# Patient Record
Sex: FEMALE | Race: WHITE | Employment: FULL TIME | URBAN - METROPOLITAN AREA
[De-identification: names, ages, dates, MRNs, and addresses within clinical notes are randomized per-mention and may not be internally consistent; named-entity substitution may affect disease eponyms.]

---

## 2022-03-26 ENCOUNTER — APPOINTMENT (OUTPATIENT)
Dept: GENERAL RADIOLOGY | Age: 62
DRG: 063 | End: 2022-03-26
Attending: EMERGENCY MEDICINE
Payer: COMMERCIAL

## 2022-03-26 ENCOUNTER — APPOINTMENT (OUTPATIENT)
Dept: CT IMAGING | Age: 62
DRG: 063 | End: 2022-03-26
Attending: PHYSICIAN ASSISTANT
Payer: COMMERCIAL

## 2022-03-26 ENCOUNTER — HOSPITAL ENCOUNTER (INPATIENT)
Age: 62
LOS: 3 days | Discharge: HOME HEALTH | DRG: 063 | End: 2022-03-29
Attending: EMERGENCY MEDICINE | Admitting: FAMILY MEDICINE
Payer: COMMERCIAL

## 2022-03-26 DIAGNOSIS — I63.9 CEREBROVASCULAR ACCIDENT (CVA), UNSPECIFIED MECHANISM (HCC): Primary | ICD-10-CM

## 2022-03-26 PROBLEM — I10 HTN (HYPERTENSION): Status: ACTIVE | Noted: 2022-03-26

## 2022-03-26 PROBLEM — R73.03 PREDIABETES: Status: ACTIVE | Noted: 2022-03-26

## 2022-03-26 LAB
ALBUMIN SERPL-MCNC: 4 G/DL (ref 3.4–5)
ALBUMIN/GLOB SERPL: 1 {RATIO} (ref 0.8–1.7)
ALP SERPL-CCNC: 105 U/L (ref 45–117)
ALT SERPL-CCNC: 26 U/L (ref 13–56)
AMPHET UR QL SCN: NEGATIVE
ANION GAP SERPL CALC-SCNC: 7 MMOL/L (ref 3–18)
AST SERPL-CCNC: 21 U/L (ref 10–38)
BARBITURATES UR QL SCN: NEGATIVE
BASOPHILS # BLD: 0 K/UL (ref 0–0.1)
BASOPHILS NFR BLD: 1 % (ref 0–2)
BENZODIAZ UR QL: NEGATIVE
BILIRUB SERPL-MCNC: 0.4 MG/DL (ref 0.2–1)
BUN SERPL-MCNC: 18 MG/DL (ref 7–18)
BUN/CREAT SERPL: 15 (ref 12–20)
CALCIUM SERPL-MCNC: 9.5 MG/DL (ref 8.5–10.1)
CANNABINOIDS UR QL SCN: NEGATIVE
CHLORIDE SERPL-SCNC: 105 MMOL/L (ref 100–111)
CO2 SERPL-SCNC: 27 MMOL/L (ref 21–32)
COCAINE UR QL SCN: NEGATIVE
CREAT SERPL-MCNC: 1.19 MG/DL (ref 0.6–1.3)
DIFFERENTIAL METHOD BLD: ABNORMAL
EOSINOPHIL # BLD: 0.1 K/UL (ref 0–0.4)
EOSINOPHIL NFR BLD: 1 % (ref 0–5)
ERYTHROCYTE [DISTWIDTH] IN BLOOD BY AUTOMATED COUNT: 14.2 % (ref 11.6–14.5)
GLOBULIN SER CALC-MCNC: 4.2 G/DL (ref 2–4)
GLUCOSE BLD STRIP.AUTO-MCNC: 117 MG/DL (ref 70–110)
GLUCOSE SERPL-MCNC: 132 MG/DL (ref 74–99)
HCT VFR BLD AUTO: 43.6 % (ref 35–45)
HDSCOM,HDSCOM: NORMAL
HGB BLD-MCNC: 13.4 G/DL (ref 12–16)
IMM GRANULOCYTES # BLD AUTO: 0 K/UL (ref 0–0.04)
IMM GRANULOCYTES NFR BLD AUTO: 0 % (ref 0–0.5)
LYMPHOCYTES # BLD: 3.3 K/UL (ref 0.9–3.6)
LYMPHOCYTES NFR BLD: 41 % (ref 21–52)
MCH RBC QN AUTO: 25 PG (ref 24–34)
MCHC RBC AUTO-ENTMCNC: 30.7 G/DL (ref 31–37)
MCV RBC AUTO: 81.2 FL (ref 78–100)
METHADONE UR QL: NEGATIVE
MONOCYTES # BLD: 0.5 K/UL (ref 0.05–1.2)
MONOCYTES NFR BLD: 7 % (ref 3–10)
NEUTS SEG # BLD: 4.2 K/UL (ref 1.8–8)
NEUTS SEG NFR BLD: 51 % (ref 40–73)
NRBC # BLD: 0 K/UL (ref 0–0.01)
NRBC BLD-RTO: 0 PER 100 WBC
OPIATES UR QL: NEGATIVE
PCP UR QL: NEGATIVE
PLATELET # BLD AUTO: 288 K/UL (ref 135–420)
PMV BLD AUTO: 9.5 FL (ref 9.2–11.8)
POTASSIUM SERPL-SCNC: 3.9 MMOL/L (ref 3.5–5.5)
PROT SERPL-MCNC: 8.2 G/DL (ref 6.4–8.2)
RBC # BLD AUTO: 5.37 M/UL (ref 4.2–5.3)
SODIUM SERPL-SCNC: 139 MMOL/L (ref 136–145)
TROPONIN-HIGH SENSITIVITY: 10 NG/L (ref 0–54)
WBC # BLD AUTO: 8.2 K/UL (ref 4.6–13.2)

## 2022-03-26 PROCEDURE — 74011000250 HC RX REV CODE- 250: Performed by: FAMILY MEDICINE

## 2022-03-26 PROCEDURE — 82962 GLUCOSE BLOOD TEST: CPT

## 2022-03-26 PROCEDURE — 70496 CT ANGIOGRAPHY HEAD: CPT

## 2022-03-26 PROCEDURE — 80053 COMPREHEN METABOLIC PANEL: CPT

## 2022-03-26 PROCEDURE — 94762 N-INVAS EAR/PLS OXIMTRY CONT: CPT

## 2022-03-26 PROCEDURE — 93005 ELECTROCARDIOGRAM TRACING: CPT

## 2022-03-26 PROCEDURE — 74011000250 HC RX REV CODE- 250: Performed by: EMERGENCY MEDICINE

## 2022-03-26 PROCEDURE — 99285 EMERGENCY DEPT VISIT HI MDM: CPT

## 2022-03-26 PROCEDURE — 80307 DRUG TEST PRSMV CHEM ANLYZR: CPT

## 2022-03-26 PROCEDURE — 85025 COMPLETE CBC W/AUTO DIFF WBC: CPT

## 2022-03-26 PROCEDURE — 71045 X-RAY EXAM CHEST 1 VIEW: CPT

## 2022-03-26 PROCEDURE — 74011250636 HC RX REV CODE- 250/636: Performed by: EMERGENCY MEDICINE

## 2022-03-26 PROCEDURE — 3E03317 INTRODUCTION OF OTHER THROMBOLYTIC INTO PERIPHERAL VEIN, PERCUTANEOUS APPROACH: ICD-10-PCS | Performed by: FAMILY MEDICINE

## 2022-03-26 PROCEDURE — 70450 CT HEAD/BRAIN W/O DYE: CPT

## 2022-03-26 PROCEDURE — 74011000636 HC RX REV CODE- 636: Performed by: EMERGENCY MEDICINE

## 2022-03-26 PROCEDURE — 96376 TX/PRO/DX INJ SAME DRUG ADON: CPT

## 2022-03-26 PROCEDURE — 84484 ASSAY OF TROPONIN QUANT: CPT

## 2022-03-26 PROCEDURE — 96374 THER/PROPH/DIAG INJ IV PUSH: CPT

## 2022-03-26 PROCEDURE — 65610000006 HC RM INTENSIVE CARE

## 2022-03-26 PROCEDURE — 74011000258 HC RX REV CODE- 258: Performed by: EMERGENCY MEDICINE

## 2022-03-26 PROCEDURE — 96365 THER/PROPH/DIAG IV INF INIT: CPT

## 2022-03-26 PROCEDURE — 74011250637 HC RX REV CODE- 250/637: Performed by: EMERGENCY MEDICINE

## 2022-03-26 PROCEDURE — 74011250636 HC RX REV CODE- 250/636: Performed by: FAMILY MEDICINE

## 2022-03-26 RX ORDER — ATORVASTATIN CALCIUM 20 MG/1
80 TABLET, FILM COATED ORAL
Status: DISCONTINUED | OUTPATIENT
Start: 2022-03-26 | End: 2022-03-29 | Stop reason: HOSPADM

## 2022-03-26 RX ORDER — MAGNESIUM SULFATE 100 %
16 CRYSTALS MISCELLANEOUS AS NEEDED
Status: DISCONTINUED | OUTPATIENT
Start: 2022-03-26 | End: 2022-03-29 | Stop reason: HOSPADM

## 2022-03-26 RX ORDER — INSULIN LISPRO 100 [IU]/ML
INJECTION, SOLUTION INTRAVENOUS; SUBCUTANEOUS EVERY 6 HOURS
Status: DISCONTINUED | OUTPATIENT
Start: 2022-03-27 | End: 2022-03-27

## 2022-03-26 RX ORDER — DEXTROSE MONOHYDRATE 100 MG/ML
0-250 INJECTION, SOLUTION INTRAVENOUS AS NEEDED
Status: DISCONTINUED | OUTPATIENT
Start: 2022-03-26 | End: 2022-03-28 | Stop reason: SDUPTHER

## 2022-03-26 RX ORDER — SODIUM CHLORIDE, SODIUM LACTATE, POTASSIUM CHLORIDE, CALCIUM CHLORIDE 600; 310; 30; 20 MG/100ML; MG/100ML; MG/100ML; MG/100ML
75 INJECTION, SOLUTION INTRAVENOUS CONTINUOUS
Status: DISCONTINUED | OUTPATIENT
Start: 2022-03-27 | End: 2022-03-28

## 2022-03-26 RX ORDER — SODIUM CHLORIDE 0.9 % (FLUSH) 0.9 %
5-40 SYRINGE (ML) INJECTION EVERY 8 HOURS
Status: DISCONTINUED | OUTPATIENT
Start: 2022-03-26 | End: 2022-03-29 | Stop reason: HOSPADM

## 2022-03-26 RX ORDER — SODIUM CHLORIDE 0.9 % (FLUSH) 0.9 %
5-40 SYRINGE (ML) INJECTION AS NEEDED
Status: DISCONTINUED | OUTPATIENT
Start: 2022-03-26 | End: 2022-03-29 | Stop reason: SDUPTHER

## 2022-03-26 RX ORDER — DEXTROSE MONOHYDRATE 100 MG/ML
0-250 INJECTION, SOLUTION INTRAVENOUS AS NEEDED
Status: DISCONTINUED | OUTPATIENT
Start: 2022-03-26 | End: 2022-03-29 | Stop reason: HOSPADM

## 2022-03-26 RX ORDER — ONDANSETRON 2 MG/ML
4 INJECTION INTRAMUSCULAR; INTRAVENOUS
Status: DISCONTINUED | OUTPATIENT
Start: 2022-03-26 | End: 2022-03-29 | Stop reason: HOSPADM

## 2022-03-26 RX ORDER — SODIUM CHLORIDE 0.9 % (FLUSH) 0.9 %
5-40 SYRINGE (ML) INJECTION EVERY 8 HOURS
Status: DISCONTINUED | OUTPATIENT
Start: 2022-03-26 | End: 2022-03-29 | Stop reason: SDUPTHER

## 2022-03-26 RX ORDER — POLYETHYLENE GLYCOL 3350 17 G/17G
17 POWDER, FOR SOLUTION ORAL DAILY PRN
Status: DISCONTINUED | OUTPATIENT
Start: 2022-03-26 | End: 2022-03-29 | Stop reason: HOSPADM

## 2022-03-26 RX ORDER — LABETALOL HCL 20 MG/4 ML
10 SYRINGE (ML) INTRAVENOUS ONCE
Status: COMPLETED | OUTPATIENT
Start: 2022-03-26 | End: 2022-03-26

## 2022-03-26 RX ORDER — ACETAMINOPHEN 325 MG/1
650 TABLET ORAL
Status: DISCONTINUED | OUTPATIENT
Start: 2022-03-26 | End: 2022-03-29 | Stop reason: HOSPADM

## 2022-03-26 RX ORDER — ACETAMINOPHEN 650 MG/1
650 SUPPOSITORY RECTAL
Status: DISCONTINUED | OUTPATIENT
Start: 2022-03-26 | End: 2022-03-29 | Stop reason: HOSPADM

## 2022-03-26 RX ORDER — ATORVASTATIN CALCIUM 20 MG/1
80 TABLET, FILM COATED ORAL
Status: DISCONTINUED | OUTPATIENT
Start: 2022-03-27 | End: 2022-03-26

## 2022-03-26 RX ORDER — LORAZEPAM 2 MG/ML
1 INJECTION INTRAMUSCULAR ONCE
Status: ACTIVE | OUTPATIENT
Start: 2022-03-27 | End: 2022-03-27

## 2022-03-26 RX ORDER — SODIUM CHLORIDE 0.9 % (FLUSH) 0.9 %
5-40 SYRINGE (ML) INJECTION AS NEEDED
Status: DISCONTINUED | OUTPATIENT
Start: 2022-03-26 | End: 2022-03-29 | Stop reason: HOSPADM

## 2022-03-26 RX ADMIN — IOPAMIDOL 100 ML: 755 INJECTION, SOLUTION INTRAVENOUS at 18:31

## 2022-03-26 RX ADMIN — ALTEPLASE 7.8 MG: KIT at 19:36

## 2022-03-26 RX ADMIN — SODIUM CHLORIDE 50 ML: 9 INJECTION, SOLUTION INTRAVENOUS at 20:38

## 2022-03-26 RX ADMIN — ATORVASTATIN CALCIUM 80 MG: 20 TABLET, FILM COATED ORAL at 23:51

## 2022-03-26 RX ADMIN — LABETALOL HYDROCHLORIDE 5 MG: 5 INJECTION, SOLUTION INTRAVENOUS at 19:35

## 2022-03-26 RX ADMIN — SODIUM CHLORIDE, PRESERVATIVE FREE 10 ML: 5 INJECTION INTRAVENOUS at 23:50

## 2022-03-26 RX ADMIN — ALTEPLASE 70 MG: KIT at 19:43

## 2022-03-26 RX ADMIN — FAMOTIDINE 20 MG: 10 INJECTION, SOLUTION INTRAVENOUS at 23:43

## 2022-03-26 NOTE — ED TRIAGE NOTES
Pt arrives to ED with c\o facial droop that started at 1700, pt also with slurred speech in triage    PA Gipson at bedside bedside    Code stroke called at [phone]

## 2022-03-26 NOTE — ED NOTES
Pt noted to have continued slurred speech/ L arm droop/L facial droop. Denies vision changes/HA/CP/SOB    Continues to be 1:1 r/t Alteplase infusion on full cardiac monitor per protocol. Will continue to monitor.

## 2022-03-26 NOTE — ED PROVIDER NOTES
EMERGENCY DEPARTMENT HISTORY AND PHYSICAL EXAM    Date: 3/26/2022  Patient Name: Janina Suresh    History of Presenting Illness     Chief Complaint   Patient presents with    Facial Droop       History Provided By: Patient     History Baez Been):   6:23 PM  Janina Suresh is a 59 y.o. female with PMHX of HTN who presents to the emergency department C/O facial droop onset 5:00 PM tonight. Associated sxs include slight slurred speech, left arm weakness, drooling. Pt denies any other sxs or complaints. Symptoms started at 5:00 PM tonight. Chief Complaint: facial droop  Onset: 5:00 PM   Timing:  acute  Context: Symptoms started spontaneously, symptoms have rapidly worsened since onset  Location: generalized  Quality: weakness  Severity: Severe  Modifying Factors: Nothing makes it better, or worse. Associated Symptoms: slurred speech, left arm weakness, drooling    PCP: No primary care provider on file. Past History         Past Medical History:  No past medical history on file. Past Surgical History:  No past surgical history on file. Family History:  No family history on file. Reviewed and non-contributory    Social History:  Social History     Tobacco Use    Smoking status: Not on file    Smokeless tobacco: Not on file   Substance Use Topics    Alcohol use: Not on file    Drug use: Not on file       Allergies:  No Known Allergies      Review of Systems      Review of Systems   Constitutional: Negative for chills and fever. HENT: Negative for congestion, rhinorrhea and sore throat. Eyes: Negative for pain and visual disturbance. Respiratory: Negative for cough, shortness of breath and wheezing. Cardiovascular: Negative for chest pain and palpitations. Gastrointestinal: Negative for abdominal pain, diarrhea and vomiting. Genitourinary: Negative for dysuria, flank pain, frequency and urgency. Musculoskeletal: Negative for arthralgias and myalgias. Skin: Negative for rash and wound. Neurological: Positive for facial asymmetry, speech difficulty and weakness. Negative for light-headedness and headaches. Psychiatric/Behavioral: Negative for agitation and confusion. All other systems reviewed and are negative. Physical Exam   There were no vitals filed for this visit. Physical Exam  Vitals and nursing note reviewed. Constitutional:       General: She is not in acute distress. Appearance: Normal appearance. She is normal weight. She is not ill-appearing. HENT:      Head: Normocephalic and atraumatic. Nose: Nose normal. No rhinorrhea. Mouth/Throat:      Mouth: Mucous membranes are moist.      Pharynx: No oropharyngeal exudate or posterior oropharyngeal erythema. Eyes:      Extraocular Movements: Extraocular movements intact. Conjunctiva/sclera: Conjunctivae normal.      Pupils: Pupils are equal, round, and reactive to light. Cardiovascular:      Rate and Rhythm: Normal rate and regular rhythm. Heart sounds: No murmur heard. No friction rub. No gallop. Pulmonary:      Effort: Pulmonary effort is normal. No respiratory distress. Breath sounds: Normal breath sounds. No wheezing, rhonchi or rales. Abdominal:      General: Bowel sounds are normal.      Palpations: Abdomen is soft. Tenderness: There is no abdominal tenderness. There is no guarding or rebound. Musculoskeletal:         General: No swelling, tenderness or deformity. Normal range of motion. Cervical back: Normal range of motion and neck supple. No rigidity. Lymphadenopathy:      Cervical: No cervical adenopathy. Skin:     General: Skin is warm and dry. Findings: No rash. Neurological:      General: No focal deficit present. Mental Status: She is alert and oriented to person, place, and time. Cranial Nerves: Cranial nerve deficit, dysarthria and facial asymmetry present. Sensory: Sensation is intact. Motor: Weakness and pronator drift present. No tremor, atrophy, abnormal muscle tone or seizure activity. Gait: Gait is intact. Psychiatric:         Mood and Affect: Mood normal.         Behavior: Behavior normal.         Diagnostic Study Results     Labs -     Recent Results (from the past 12 hour(s))   METABOLIC PANEL, COMPREHENSIVE    Collection Time: 03/27/22  5:26 AM   Result Value Ref Range    Sodium 140 136 - 145 mmol/L    Potassium 3.8 3.5 - 5.5 mmol/L    Chloride 106 100 - 111 mmol/L    CO2 31 21 - 32 mmol/L    Anion gap 3 3.0 - 18 mmol/L    Glucose 113 (H) 74 - 99 mg/dL    BUN 18 7.0 - 18 MG/DL    Creatinine 1.14 0.6 - 1.3 MG/DL    BUN/Creatinine ratio 16 12 - 20      GFR est AA 58 (L) >60 ml/min/1.73m2    GFR est non-AA 48 (L) >60 ml/min/1.73m2    Calcium 8.9 8.5 - 10.1 MG/DL    Bilirubin, total 0.2 0.2 - 1.0 MG/DL    ALT (SGPT) 21 13 - 56 U/L    AST (SGOT) 16 10 - 38 U/L    Alk. phosphatase 83 45 - 117 U/L    Protein, total 6.8 6.4 - 8.2 g/dL    Albumin 3.1 (L) 3.4 - 5.0 g/dL    Globulin 3.7 2.0 - 4.0 g/dL    A-G Ratio 0.8 0.8 - 1.7     CBC W/O DIFF    Collection Time: 03/27/22  5:26 AM   Result Value Ref Range    WBC 5.9 4.6 - 13.2 K/uL    RBC 4.44 4.20 - 5.30 M/uL    HGB 11.2 (L) 12.0 - 16.0 g/dL    HCT 36.7 35.0 - 45.0 %    MCV 82.7 78.0 - 100.0 FL    MCH 25.2 24.0 - 34.0 PG    MCHC 30.5 (L) 31.0 - 37.0 g/dL    RDW 14.3 11.6 - 14.5 %    PLATELET 720 574 - 789 K/uL    MPV 9.8 9.2 - 11.8 FL    NRBC 0.0 0  WBC    ABSOLUTE NRBC 0.00 0.00 - 0.01 K/uL       Radiologic Studies -   XR CHEST PORT   Final Result   --------------      No evidence for active cardiopulmonary disease. CT HEAD WO CONT   Final Result      1. Age-indeterminate left frontal lobe deep white matter lucency, without mass   effect. Differential considerations would include chronic small vessel ischemic   changes versus white matter infarct. 2. No acute intra-axial hemorrhage.       Code S report directly communicated to Angelique Plaza on 3/26/2022 6:55 PM. Results understood and acknowledged.               CTA HEAD NECK W WO CONT    (Results Pending)   MRI BRAIN WO CONT    (Results Pending)   CT HEAD WO CONT    (Results Pending)     CT Results  (Last 48 hours)    None        CXR Results  (Last 48 hours)    None          Medications given in the ED-  Medications   sodium chloride 0.9 % bolus infusion 1,000 mL (0 mL IntraVENous Held 3/26/22 1831)   sodium chloride (NS) flush 5-40 mL ( IntraVENous Canceled Entry 3/27/22 0600)   sodium chloride (NS) flush 5-40 mL (has no administration in time range)   ondansetron (ZOFRAN) injection 4 mg (has no administration in time range)   dextrose 10% infusion 0-250 mL (has no administration in time range)   atorvastatin (LIPITOR) tablet 80 mg (80 mg Oral Given 3/26/22 2351)   sodium chloride (NS) flush 5-40 mL ( IntraVENous Canceled Entry 3/27/22 0600)   sodium chloride (NS) flush 5-40 mL (has no administration in time range)   acetaminophen (TYLENOL) tablet 650 mg (has no administration in time range)     Or   acetaminophen (TYLENOL) suppository 650 mg (has no administration in time range)   polyethylene glycol (MIRALAX) packet 17 g (has no administration in time range)   famotidine (PF) (PEPCID) 20 mg in 0.9% sodium chloride 10 mL injection (20 mg IntraVENous Given 3/27/22 0904)   insulin lispro (HUMALOG) injection ( SubCUTAneous Canceled Entry 3/27/22 0600)   glucose chewable tablet 16 g (has no administration in time range)   glucagon (GLUCAGEN) injection 1 mg (has no administration in time range)   dextrose 10% infusion 0-250 mL (has no administration in time range)   LORazepam (ATIVAN) injection 1 mg (0 mg IntraVENous Held 3/27/22 0800)   lactated Ringers infusion (75 mL/hr IntraVENous New Bag 3/27/22 0006)   iopamidoL (ISOVUE-370) 76 % injection 100 mL (100 mL IntraVENous Given 3/26/22 1831)   labetaloL (NORMODYNE;TRANDATE) 20 mg/4 mL (5 mg/mL) injection 10 mg (5 mg IntraVENous Given 3/26/22 1935)   alteplase (ACTIVASE) bolus dose (0 mg IntraVENous Transfusion Completed 3/26/22 1940)     Followed by   alteplase (ACTIVASE) infusion 70.55 mg (0 mg IntraVENous Transfusion Completed 3/26/22 2037)     Followed by   sodium chloride 0.9 % bolus infusion 50 mL (0 mL IntraVENous Transfusion Completed 3/26/22 2128)         Procedures     Procedures    ED Course     I am the first provider for this patient. I reviewed the vital signs, available nursing notes, past medical history, past surgical history, family history and social history. Records Reviewed: Nursing Notes    Pulse Oximetry Analysis - 97% on RA     Cardiac Monitor:  Rate: 89 bpm  Rhythm: sinus rhythm    EKG interpretation: (Preliminary)  Rhythm: NSR. Rate: 86 bpm; no STEMI  EKG read by Bhupinder Johnson MD at 6:57 PM    6:23 PM Initial assessment performed. The patients presenting problems have been discussed, and they are in agreement with the care plan formulated and outlined with them. I have encouraged them to ask questions as they arise throughout their visit. ED Course as of 03/26/22 1916   Sat Mar 26, 2022   1828 Discussed with Dr. Aditi Westfall, teleneurology, who will evaluate the patient. [JM]   6916 Discussed with Dr. Jorge Alberto Browning, teleneurology, recommends TPA for CVA and labetalol to keep SBP under 180 mm Hg. [JM]   1910 Discussed with Dr. Niraj Santiago, as long as there is no large vessel occlusion, patient will need to be admitted to the ICU, MRI and stroke work-up. [JM]      ED Course User Index  [JM] Ingrid Haywood MD       BEDSIDE TRANSFER OF CARE:  7:15 PM   Patient care will be transferred from Bhupinder Johnson MD to Dr. Rashaun Mendez. Discussed available diagnostic results and care plan at length at beside. Patient and family made aware of provider change. All questions answered. Patient and family voiced understanding. Medical Decision Making     Provider Notes (Medical Decision Making):   DDX: CVA, TIA, complex migraine, Bell's palsy    Discussion:  59 y.o. female with acute CVA. Patient was not found to have LVO. She was a TPA candidate and was administered TPA in the emergency department. Patient was admitted to the ICU with Dr. Bri Callahan. Coordination for this was accomplished by Dr. Cassius Jerome. Patient understands and agrees with this plan. Critical Care Time:   I have spent 34 minutes of critical care time involved in lab review, consultations with specialist, family decision-making, and documentation. During this entire length of time I was immediately available to the patient. Critical Care: The reason for providing this level of medical care for this critically ill patient was due a critical illness that impaired one or more vital organ systems such that there was a high probability of imminent or life threatening deterioration in the patients condition. This care involved high complexity decision making to assess, manipulate, and support vital system functions, to treat this degreee vital organ system failure and to prevent further life threatening deterioration of the patients condition. Diagnosis and Disposition     Critical Care Time: 34 minutes    Core Measures:  For Hospitalized Patients:    1. Hospitalization Decision Time:  The decision to hospitalize the patient was made by Corie Montelongo MD, MD at 6:28 PM on 3/26/2022    2. Aspirin: Was not given because the patient was given TPA. Patient is being admitted to the hospital by Dr. Bennetta Boast. The results of their tests and reasons for their admission have been discussed with them and/or available family. They convey agreement and understanding for the need to be admitted and for their admission diagnosis. CONDITIONS ON ADMISSION:  Sepsis is not present at the time of admission. Deep Vein Thrombosis is not present at the time of admission. Thrombosis is not present at the time of admission. Urinary Tract Infection is not present at the time of admission.  Pneumonia is not present at the time of admission. MRSA is not present at the time of admission. Wound infection is not present at the time of admission. Pressure Ulcer is not present at the time of admission. CLINICAL IMPRESSION:    1. Cerebrovascular accident (CVA), unspecified mechanism ([address]          Dragon Disclaimer     Please note that this dictation was completed with AppTap, the computer voice recognition software. Quite often unanticipated grammatical, syntax, homophones, and other interpretive errors are inadvertently transcribed by the computer software. Please disregard these errors. Please excuse any errors that have escaped final proofreading.     Giuliano Gonzalez MD

## 2022-03-27 ENCOUNTER — APPOINTMENT (OUTPATIENT)
Dept: MRI IMAGING | Age: 62
DRG: 063 | End: 2022-03-27
Attending: FAMILY MEDICINE
Payer: COMMERCIAL

## 2022-03-27 ENCOUNTER — APPOINTMENT (OUTPATIENT)
Dept: CT IMAGING | Age: 62
DRG: 063 | End: 2022-03-27
Attending: PSYCHIATRY & NEUROLOGY
Payer: COMMERCIAL

## 2022-03-27 LAB
ALBUMIN SERPL-MCNC: 3.1 G/DL (ref 3.4–5)
ALBUMIN/GLOB SERPL: 0.8 {RATIO} (ref 0.8–1.7)
ALP SERPL-CCNC: 83 U/L (ref 45–117)
ALT SERPL-CCNC: 21 U/L (ref 13–56)
ANION GAP SERPL CALC-SCNC: 3 MMOL/L (ref 3–18)
AST SERPL-CCNC: 16 U/L (ref 10–38)
BILIRUB SERPL-MCNC: 0.2 MG/DL (ref 0.2–1)
BUN SERPL-MCNC: 18 MG/DL (ref 7–18)
BUN/CREAT SERPL: 16 (ref 12–20)
CALCIUM SERPL-MCNC: 8.9 MG/DL (ref 8.5–10.1)
CHLORIDE SERPL-SCNC: 106 MMOL/L (ref 100–111)
CHOLEST SERPL-MCNC: 229 MG/DL
CO2 SERPL-SCNC: 31 MMOL/L (ref 21–32)
CREAT SERPL-MCNC: 1.14 MG/DL (ref 0.6–1.3)
ERYTHROCYTE [DISTWIDTH] IN BLOOD BY AUTOMATED COUNT: 14.3 % (ref 11.6–14.5)
EST. AVERAGE GLUCOSE BLD GHB EST-MCNC: 126 MG/DL
GLOBULIN SER CALC-MCNC: 3.7 G/DL (ref 2–4)
GLUCOSE BLD STRIP.AUTO-MCNC: 101 MG/DL (ref 70–110)
GLUCOSE BLD STRIP.AUTO-MCNC: 83 MG/DL (ref 70–110)
GLUCOSE BLD STRIP.AUTO-MCNC: 90 MG/DL (ref 70–110)
GLUCOSE SERPL-MCNC: 113 MG/DL (ref 74–99)
HBA1C MFR BLD: 6 % (ref 4.2–5.6)
HCT VFR BLD AUTO: 36.7 % (ref 35–45)
HDLC SERPL-MCNC: 32 MG/DL (ref 40–60)
HDLC SERPL: 7.2 {RATIO} (ref 0–5)
HGB BLD-MCNC: 11.2 G/DL (ref 12–16)
LDLC SERPL CALC-MCNC: 141.8 MG/DL (ref 0–100)
LIPID PROFILE,FLP: ABNORMAL
MCH RBC QN AUTO: 25.2 PG (ref 24–34)
MCHC RBC AUTO-ENTMCNC: 30.5 G/DL (ref 31–37)
MCV RBC AUTO: 82.7 FL (ref 78–100)
NRBC # BLD: 0 K/UL (ref 0–0.01)
NRBC BLD-RTO: 0 PER 100 WBC
PLATELET # BLD AUTO: 244 K/UL (ref 135–420)
PMV BLD AUTO: 9.8 FL (ref 9.2–11.8)
POTASSIUM SERPL-SCNC: 3.8 MMOL/L (ref 3.5–5.5)
PROT SERPL-MCNC: 6.8 G/DL (ref 6.4–8.2)
RBC # BLD AUTO: 4.44 M/UL (ref 4.2–5.3)
SODIUM SERPL-SCNC: 140 MMOL/L (ref 136–145)
TRIGL SERPL-MCNC: 276 MG/DL (ref ?–150)
VLDLC SERPL CALC-MCNC: 55.2 MG/DL
WBC # BLD AUTO: 5.9 K/UL (ref 4.6–13.2)

## 2022-03-27 PROCEDURE — 92522 EVALUATE SPEECH PRODUCTION: CPT

## 2022-03-27 PROCEDURE — 74011250636 HC RX REV CODE- 250/636: Performed by: FAMILY MEDICINE

## 2022-03-27 PROCEDURE — 92610 EVALUATE SWALLOWING FUNCTION: CPT

## 2022-03-27 PROCEDURE — 83036 HEMOGLOBIN GLYCOSYLATED A1C: CPT

## 2022-03-27 PROCEDURE — 80061 LIPID PANEL: CPT

## 2022-03-27 PROCEDURE — 74011250637 HC RX REV CODE- 250/637: Performed by: EMERGENCY MEDICINE

## 2022-03-27 PROCEDURE — 85027 COMPLETE CBC AUTOMATED: CPT

## 2022-03-27 PROCEDURE — 74011000250 HC RX REV CODE- 250: Performed by: FAMILY MEDICINE

## 2022-03-27 PROCEDURE — 65610000006 HC RM INTENSIVE CARE

## 2022-03-27 PROCEDURE — 74011000250 HC RX REV CODE- 250: Performed by: EMERGENCY MEDICINE

## 2022-03-27 PROCEDURE — 82962 GLUCOSE BLOOD TEST: CPT

## 2022-03-27 PROCEDURE — 70551 MRI BRAIN STEM W/O DYE: CPT

## 2022-03-27 PROCEDURE — 80053 COMPREHEN METABOLIC PANEL: CPT

## 2022-03-27 PROCEDURE — 70450 CT HEAD/BRAIN W/O DYE: CPT

## 2022-03-27 PROCEDURE — 36415 COLL VENOUS BLD VENIPUNCTURE: CPT

## 2022-03-27 RX ORDER — INSULIN LISPRO 100 [IU]/ML
INJECTION, SOLUTION INTRAVENOUS; SUBCUTANEOUS
Status: DISCONTINUED | OUTPATIENT
Start: 2022-03-27 | End: 2022-03-29 | Stop reason: HOSPADM

## 2022-03-27 RX ORDER — LORAZEPAM 2 MG/ML
1 INJECTION INTRAMUSCULAR ONCE
Status: DISCONTINUED | OUTPATIENT
Start: 2022-03-27 | End: 2022-03-27

## 2022-03-27 RX ORDER — LORAZEPAM 2 MG/ML
1 INJECTION INTRAMUSCULAR ONCE
Status: COMPLETED | OUTPATIENT
Start: 2022-03-27 | End: 2022-03-27

## 2022-03-27 RX ADMIN — ATORVASTATIN CALCIUM 80 MG: 20 TABLET, FILM COATED ORAL at 21:10

## 2022-03-27 RX ADMIN — SODIUM CHLORIDE, PRESERVATIVE FREE 10 ML: 5 INJECTION INTRAVENOUS at 21:10

## 2022-03-27 RX ADMIN — FAMOTIDINE 20 MG: 10 INJECTION, SOLUTION INTRAVENOUS at 21:11

## 2022-03-27 RX ADMIN — FAMOTIDINE 20 MG: 10 INJECTION, SOLUTION INTRAVENOUS at 09:04

## 2022-03-27 RX ADMIN — LORAZEPAM 1 MG: 2 INJECTION INTRAMUSCULAR; INTRAVENOUS at 12:50

## 2022-03-27 RX ADMIN — SODIUM CHLORIDE, POTASSIUM CHLORIDE, SODIUM LACTATE AND CALCIUM CHLORIDE 75 ML/HR: 600; 310; 30; 20 INJECTION, SOLUTION INTRAVENOUS at 00:06

## 2022-03-27 RX ADMIN — SODIUM CHLORIDE, PRESERVATIVE FREE 10 ML: 5 INJECTION INTRAVENOUS at 21:12

## 2022-03-27 RX ADMIN — SODIUM CHLORIDE, PRESERVATIVE FREE 10 ML: 5 INJECTION INTRAVENOUS at 14:00

## 2022-03-27 NOTE — PROGRESS NOTES
Hospitalist Progress Note    Patient: Faye Last MRN: [id number]  CSN: [id number]    YOB: 1960  Age: 59 y.o. Sex: female    DOA: 3/26/2022 LOS:  LOS: 1 day                Assessment/Plan     Patient Active Problem List   Diagnosis Code    Stroke ([facility] I63.9    BMI 32.0-32.9,adult Z68.32    Prediabetes R73.03    HTN (hypertension) I10        Chief complaint :  Left facial droop, slurred speech. 41-year-old female with hypertension, prediabetes, and obesity is admitted for a stroke and is status post TPA. CRITICAL CARE PLAN    Resp -  No respiratory issues,     ID - no evidence of infection. CVS - Monitor HD. Permissive HTN. Follow echo    Heme/onc - Follow H&H, plts. INR. Renal - Trend BUN, Cr. Check and replace Mg, K, phos. Endocrine -  Follow FSG    Neuro/ Pain/ Sedation -   Acute CVA -   MRI showed Small embolic acute/subacute right perisylvian MCA territory infarct. CTA head and neck with tiny calcified embolus. S/p tPA. Continue statin  Start aspirin 24 hours after tPA  Neurology following    GI - regular diet. Prophylaxis - DVT: s/p tPA GI: pepcid. Disposition : 2-3 days    Review of systems  General: No fevers or chills. Cardiovascular: No chest pain or pressure. No palpitations. Pulmonary: No shortness of breath. Gastrointestinal: No nausea, vomiting. Physical Exam:  General: Awake, cooperative, no acute distress    HEENT: NC, Atraumatic. PERRLA, anicteric sclerae. Lungs: CTA Bilaterally. No Wheezing/Rhonchi/Rales. Heart:  S1 S2,  No murmur, No Rubs, No Gallops  Abdomen: Soft, Non distended, Non tender.  +Bowel sounds,   Extremities: No c/c/e  Psych:   Not anxious or agitated. Neurological Exam:   Mental Status:  Alert , co-operative , memory intact . Cranial Nerves:  Intact visual fields. PERRL, EOM's full, no nystagmus, no ptosis. Facial sensation numbness on left. Left facial droop. Palate is midline. Normal sternocleidomastoid strength. Tongue is midline. Hearing is intact bilaterally. Motor:  5/5 strength in upper and lower proximal and distal muscles. Normal bulk and tone. However weak on left compare to right. Reflexes:  Deep tendon reflexes 2+/4 and symmetrical.    Sensory:  Normal and symmetric bilaterally. Gait:  Not tested. Cerebellar:  No cerebellar signs present. Vital signs/Intake and Output:  Visit Vitals  BP (!) 167/90   Pulse 93   Temp 98.2 °F (36.8 °C)   Resp 20   Ht 5' 4\" (1.626 m)   Wt 86.2 kg (190 lb)   SpO2 96%   Breastfeeding No   BMI 32.61 kg/m²     Current Shift:  No intake/output data recorded. Last three shifts:  03/25 1901 - 03/27 0700  In: 77.8 [I.V.:77.8]  Out: 150 [Urine:150]            Labs: Results:       Chemistry Recent Labs     03/27/22  0526 03/26/22  1824   * 132*    139   K 3.8 3.9    105   CO2 31 27   BUN 18 18   CREA 1.14 1.19   CA 8.9 9.5   AGAP 3 7   BUCR 16 15   AP 83 105   TP 6.8 8.2   ALB 3.1* 4.0   GLOB 3.7 4.2*   AGRAT 0.8 1.0      CBC w/Diff Recent Labs     03/27/22 0526 03/26/22  1825   WBC 5.9 8.2   RBC 4.44 5.37*   HGB 11.2* 13.4   HCT 36.7 43.6    288   GRANS  --  51   LYMPH  --  41   EOS  --  1      Cardiac Enzymes No results for input(s): CPK, CKND1, GARY in the last 72 hours. No lab exists for component: CKRMB, TROIP   Coagulation No results for input(s): PTP, INR, APTT, INREXT in the last 72 hours. Lipid Panel Lab Results   Component Value Date/Time    Cholesterol, total 229 (H) 03/27/2022 05:26 AM    HDL Cholesterol 32 (L) 03/27/2022 05:26 AM    LDL, calculated 141.8 (H) 03/27/2022 05:26 AM    VLDL, calculated 55.2 03/27/2022 05:26 AM    Triglyceride 276 (H) 03/27/2022 05:26 AM    CHOL/HDL Ratio 7.2 (H) 03/27/2022 05:26 AM      BNP No results for input(s): BNPP in the last 72 hours.    Liver Enzymes Recent Labs     03/27/22  0526   TP 6.8   ALB 3.1*   AP 83      Thyroid Studies No results found for: T4, T3U, TSH, TSHEXT Procedures/imaging: see electronic medical records for all procedures/Xrays and details which were not copied into this note but were reviewed prior to creation of Plan

## 2022-03-27 NOTE — H&P
History & Physical    Patient: Adam Rodriges MRN: [id number]  CSN: [id number]    YOB: 1960  Age: 59 y.o. Sex: female      DOA: 3/26/2022  Primary Care Provider:  Magaly Viveros MD      Assessment/Plan     Patient Active Problem List   Diagnosis Code    [facility] ([facility] I63.9    BMI 32.0-32.9,adult Z68.32    Prediabetes R73.03    HTN (hypertension) I8    68-year-old female with hypertension, prediabetes, and obesity is admitted for a stroke and is status post TPA. Neuropresumed partial R MCA stroke based on clinical symptoms. Head CT is negative for hemorrhage and she is status post TPA. CTA head/neck shows no large vessel occlusion. Brain MRI (premedication with ativan) is ordered for the morning. Will avoid any other anticoagulants for now. Speech therapy, PT, OT, and case management consults placed. Appreciate neurology recommendations. Pulm-no issues  CV-permissive HTN with goal to maintain BP<180/105 for 24 hours post TPA. Labetalol can be used prn if exceeding threshold. Will consult vascular surgery for R ICA stenosis but medical management will likely be recommended given stenosis of 50-60%. I have started lipitor 80 mg nightly. Echocardiogram is ordered. GI-no issues  Renal-no issues  Heme-no issues  ID-no issues  Endo-prediabetes. Will order insulin sliding scale to maintain euglycemia. Hemoglobin A1C and lipid panel pending. F/E/N-slow rate IV fluids/replete prn/NPO until speech evaluation in the morning    Full code    Prophylaxis: SCDs, pepcid IV, no anticoagulation as she is s/p TPA    Estimated length of stay : 3 nights    Family discussionher daughter was at bedside during my evaluation and all questions were answered. Alejandro Crawford MD  Nocturnist    Critical Care Time [id number]    45 minutes of critical care time spent in the direct evaluation and treatment of this high risk patient.  The reason for providing this level of medical care for this critically ill patient was due a critical illness that impaired one or more vital organ systems such that there was a high probability of imminent or life threatening deterioration in the patients condition. This care involved high complexity decision making to assess, manipulate, and support vital system functions, to treat this degreee vital organ system failure and to prevent further life threatening deterioration of the patients condition.  ----------------------------------------------------------------------------------------------------------------------------------------------------------------------------------------------------------------  CC: left facial droop, slurred speech       HPI:     Dejuan Noriega is a 59 y.o. female with hypertension, prediabetes, and obesity presents to the ED with stroke-like symptoms beginning tonight at 1700. 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 She was brushing her teeth and noted that the toothpaste was drooling out the corner of her mouth. She also had onset of left arm weakness, slurred speech, facial numbness, dizziness and blurred vision in the left eye at the same time. She did not have any impairment in coordination or gait. She drove herself to work tonight and immediately was triaged and placed in a bed for stroke evaluation. She was given TPA at the recommendation of teleneurology and her symptoms have slightly improved. She had some spots in her vision and a decrease in her maximal heart rate while exercising yesterday. She typically exercises every day and has not had any symptoms prior to this. She decided not to work out today and just slept in preparation for her shift. She does not regularly take an aspirin or any anticoagulation. She denies any headache, fever, cough, shortness of breath, chest pain, nausea, vomiting, diarrhea, or leg swelling.     Past Medical History:   Diagnosis Date    BMI 32.0-32.9,adult 3/26/2022    HTN (hypertension) 3/26/2022    Prediabetes 3/26/2022       History reviewed. No pertinent surgical history. Family History   Problem Relation Age of Onset    Stroke Mother        Social History     Socioeconomic History    Marital status:        Prior to Admission medications    Not on File       No Known Allergies    Review of Systems  Gen: No fever, chills, malaise, weight loss/gain. Heent: No headache, rhinorrhea, sore throat. Heart: No chest pain, palpitations, SEGUNDO, pnd, or orthopnea. Resp: No cough, hemoptysis, wheezing and shortness of breath. GI: No nausea, vomiting, diarrhea, constipation, melena or hematochezia. : No urinary obstruction, dysuria or hematuria. Derm: No rash, new skin lesion or pruritis. Musc/skeletal: no bone or joint complaints. Vasc: No edema, cyanosis or claudication. Endo: No heat/cold intolerance, no polyuria,polydipsia or polyphagia. Neuro: +unilateral weakness, numbness, tingling on left side  Heme: No easy bruising or bleeding. Physical Exam:     Physical Exam:  Visit Vitals  BP (!) 158/70   Pulse 79   Temp 98.2 °F (36.8 °C)   Resp 13   Ht 5' 4\" (1.626 m)   Wt 86.2 kg (190 lb)   SpO2 98%   Breastfeeding No   BMI 32.61 kg/m²           Temp (24hrs), Av.1 °F (36.7 °C), Min:97.9 °F (36.6 °C), Max:98.2 °F (36.8 °C)    1901 -  0700  In: 77.8 [I.V.:77.8]  Out: -    No intake/output data recorded. General:  Awake, cooperative, no distress, speech is intelligible but slightly slurred. Head:  Normocephalic, without obvious abnormality, atraumatic. Eyes:  Conjunctivae/corneas clear, sclera anicteric, PERRL, EOMs intact. Nose: Nares normal. No drainage or sinus tenderness. Throat: Lips, mucosa, and tongue normal.    Neck: Supple, symmetrical, trachea midline. Lungs:   Clear to auscultation bilaterally.    Heart:  Regular rate and rhythm, S1, S2 normal, no murmur, click, rub or gallop. Abdomen: Soft, non-tender. Bowel sounds normal. No masses,  No organomegaly. Extremities: Extremities normal, atraumatic, no cyanosis or edema. Capillary refill normal.   Pulses: 2+ and symmetric all extremities. Skin: Skin color pink, turgor normal. No rashes or lesions   Neurologic: Left sided facial droop noted with CN VII deficit on the left side and slight tongue deviation. Subjective decrease in sensation of the left cheek but objectively intact to light touch. 4/5 strength in left upper extremity with decreased handgrip strength. 5/5 strength in both lower extremities. Normal finger-to-nose and rapid alternating movements. Gait not assessed. Labs Reviewed: All lab results for the last 24 hours reviewed. Recent Results (from the past 24 hour(s))   GLUCOSE, POC    Collection Time: 03/26/22  6:24 PM   Result Value Ref Range    Glucose (POC) 117 (H) 70 - 449 mg/dL   METABOLIC PANEL, COMPREHENSIVE    Collection Time: 03/26/22  6:24 PM   Result Value Ref Range    Sodium 139 136 - 145 mmol/L    Potassium 3.9 3.5 - 5.5 mmol/L    Chloride 105 100 - 111 mmol/L    CO2 27 21 - 32 mmol/L    Anion gap 7 3.0 - 18 mmol/L    Glucose 132 (H) 74 - 99 mg/dL    BUN 18 7.0 - 18 MG/DL    Creatinine 1.19 0.6 - 1.3 MG/DL    BUN/Creatinine ratio 15 12 - 20      GFR est AA 56 (L) >60 ml/min/1.73m2    GFR est non-AA 46 (L) >60 ml/min/1.73m2    Calcium 9.5 8.5 - 10.1 MG/DL    Bilirubin, total 0.4 0.2 - 1.0 MG/DL    ALT (SGPT) 26 13 - 56 U/L    AST (SGOT) 21 10 - 38 U/L    Alk.  phosphatase 105 45 - 117 U/L    Protein, total 8.2 6.4 - 8.2 g/dL    Albumin 4.0 3.4 - 5.0 g/dL    Globulin 4.2 (H) 2.0 - 4.0 g/dL    A-G Ratio 1.0 0.8 - 1.7     CBC WITH AUTOMATED DIFF    Collection Time: 03/26/22  6:25 PM   Result Value Ref Range    WBC 8.2 4.6 - 13.2 K/uL    RBC 5.37 (H) 4.20 - 5.30 M/uL    HGB 13.4 12.0 - 16.0 g/dL    HCT 43.6 35.0 - 45.0 %    MCV 81.2 78.0 - 100.0 FL    MCH 25.0 24.0 - 34.0 PG    MCHC 30.7 (L) 31.0 - 37.0 g/dL    RDW 14.2 11.6 - 14.5 %    PLATELET 152 267 - 568 K/uL    MPV 9.5 9.2 - 11.8 FL    NRBC 0.0 0  WBC    ABSOLUTE NRBC 0.00 0.00 - 0.01 K/uL    NEUTROPHILS 51 40 - 73 %    LYMPHOCYTES 41 21 - 52 %    MONOCYTES 7 3 - 10 %    EOSINOPHILS 1 0 - 5 %    BASOPHILS 1 0 - 2 %    IMMATURE GRANULOCYTES 0 0.0 - 0.5 %    ABS. NEUTROPHILS 4.2 1.8 - 8.0 K/UL    ABS. LYMPHOCYTES 3.3 0.9 - 3.6 K/UL    ABS. MONOCYTES 0.5 0.05 - 1.2 K/UL    ABS. EOSINOPHILS 0.1 0.0 - 0.4 K/UL    ABS. BASOPHILS 0.0 0.0 - 0.1 K/UL    ABS. IMM.  GRANS. 0.0 0.00 - 0.04 K/UL    DF AUTOMATED     TROPONIN-HIGH SENSITIVITY    Collection Time: 03/26/22  6:25 PM   Result Value Ref Range    Troponin-High Sensitivity 10 0 - 54 ng/L   EKG, 12 LEAD, INITIAL    Collection Time: 03/26/22  6:57 PM   Result Value Ref Range    Ventricular Rate 86 BPM    Atrial Rate 86 BPM    P-R Interval 172 ms    QRS Duration 74 ms    Q-T Interval 338 ms    QTC Calculation (Bezet) 404 ms    Calculated P Axis 57 degrees    Calculated R Axis 26 degrees    Calculated T Axis 52 degrees    Diagnosis       Normal sinus rhythm  Normal ECG  No previous ECGs available     DRUG SCREEN, URINE    Collection Time: 03/26/22  9:44 PM   Result Value Ref Range    BENZODIAZEPINES Negative NEG      BARBITURATES Negative NEG      THC (TH-CANNABINOL) Negative NEG      OPIATES Negative NEG      PCP(PHENCYCLIDINE) Negative NEG      COCAINE Negative NEG      AMPHETAMINES Negative NEG      METHADONE Negative NEG      HDSCOM (NOTE)      Results  CT HEAD WO CONT (Accession [id number]) (Order [id number])    Allergies       No Known Allergies     Exam Information    Status Exam Begun  Exam Ended    Final [99] 3/26/2022 18:24 3/26/2022  6:34 PM [id number]  6:34 PM     Result Information    Status: Final result (Exam End: 3/26/2022 18:34) Provider Status: Open       CT HEAD WO CONT: Patient Communication     Released  Not seen     Study Result    Narrative & Impression   EXAM: CT HEAD WO CONT     CLINICAL INDICATION/HISTORY: code s  -Additional: CODE S     COMPARISON: None     TECHNIQUE: Axial CT imaging of the head was performed without intravenous  contrast.  One or more dose reduction techniques were used on this CT: automated exposure  control, adjustment of the mAs and/or kVp according to patient size, and  iterative reconstruction techniques. The specific techniques used on this CT  exam have been documented in the patient's electronic medical record. Digital  Imaging and Communications in Medicine (DICOM) format image data are available  to nonaffiliated external healthcare facilities or entities on a secure, media  free, reciprocally searchable basis with patient authorization for at least a  12-month period after this study.     _______________     FINDINGS:     BRAIN:     > Brain volume: Age appropriate.     > White matter: Little or no white matter disease. > Infarcts, encephalomalacia: Minimal lucency in the Left frontal lobe deep  white matter lucency adjacent to the basal ganglia and superior insular white  matter. > Parenchymal mass: None.     > Parenchymal hemorrhage: None.     > Midline shift: None.     > Miscellaneous: None.      EXTRA-AXIAL SPACES: Unremarkable. No fluid collections.     CALVARIUM: Intact.     SINUSES, MASTOIDS: Clear.     OTHER EXTRACRANIAL: Unremarkable.     _______________     IMPRESSION     1. Age-indeterminate left frontal lobe deep white matter lucency, without mass  effect. Differential considerations would include chronic small vessel ischemic  changes versus white matter infarct. 2. No acute intra-axial hemorrhage.     Code S report directly communicated to Christian Marie on 3/26/2022 6:55 PM.   Results understood and acknowledged.         CTA head and neck50-60% right proximal ICA stenosis but no large vessel occlusion

## 2022-03-27 NOTE — CONSULTS
VASCULAR CONSULTATION NOTE    A vascular consultation has been requested on Faye Last, who is a 59 y.o. female admitted to the hospital on 3/26/2022 with an admitting diagnosis of Stroke ([facility] 75.) [I63.9]. She is being seen for evaluation and possible treatment of carotid artery stenosis. Attending Physician: Dr. Anurag Cooper             Referring Physician: Dr. Kusum Marquez    HPI:  This is a 58 yo female who experienced left sided facial droop yesterday while brushing her teeth. The patient works as a nurse in the emergency room. The patient also noticed some left arm drifting and some slurred speech. The patient presented to the emergency room and received tPA. A CTA of head and neck shows about a 50% right internal carotid artery stenosis. MR of head is pending. At present time the patient states that she feels better but not back to baseline. She also complained of some left eye blurred vision at time of episode. She had no previous history of atherosclerosis of native arteries. Patient Active Problem List   Diagnosis Code    [facility]    BMI 32.0-32.9,adult Z68.32    Prediabetes R73.03    HTN (hypertension) I10       Past Medical History:   Diagnosis Date    BMI 32.0-32.9,adult 3/26/2022    HTN (hypertension) 3/26/2022    Prediabetes 3/26/2022     History reviewed. No pertinent surgical history. @socr@  Family History   Problem Relation Age of Onset    Stroke Mother        Risk factors: The patient has the following risk factors as above. The patient denies a history of as above. No Known Allergies    Home Medications:   [unfilled]    In Patient Medications:   [unfilled]    Review of Systems:   Constitutional: negative for fevers  Eyes: positive for visual disturbance, negative for icterus  Ears, nose, mouth, throat, and face: positive for left sided facial droop. , negative for hearing loss and epistaxis  Respiratory: negative for cough or dyspnea on exertion  Cardiovascular: negative for chest pain, paroxysmal nocturnal dyspnea, exertional chest pressure/discomfort, claudication, lower extremity edema  Gastrointestinal: positive for nausea, vomiting and melena  Genitourinary:negative for hematuria  Integument/breast: negative for rash  Hematologic/lymphatic: negative for easy bruising and bleeding  Musculoskeletal:negative for back pain and falls  Neurological: positive for speech problems and weakness, negative for headaches  Behavioral/Psych: negative for depression  Endocrine: negative for diabetic symptoms including poor wound healing  Allergic/Immunologic: negative for anaphylaxis    Physical Assessment:   [unfilled]    HEENT:  Normal cephalic, atraumatic. No scleral icterus,  Mucus membranes pink and moist.  There is left sided facial droop. Speech somewhat slurred. Neck:  No jvd. Lungs:  Cta. Card:  Rrr. Ext:  There is decreased left hand  strength and left arm strength. There are palpable pedal pulses. Lab      CBC:   Lab Results   Component Value Date/Time    WBC 5.9 03/27/2022 05:26 AM    RBC 4.44 03/27/2022 05:26 AM     BMP:   Lab Results   Component Value Date/Time    CO2 31 03/27/2022 05:26 AM    BUN 18 03/27/2022 05:26 AM       Xray   CTA of head and neck show approximately 50% right ica stenosis. MR of head is pending    PVL   pending    Impression:     3. 58 yo female with cva, s/p tPA infusion. Still symptomatic. 2. 50% right ica stenosis by CTA. Plan:     Continue current treatment. Will order carotid duplex for tomorrow. Will make further recommendations based on MR of head and Carotid artery duplex. Thank you for allowing us to participate in the care of this patient. Neel Parra PA-C   [phone].

## 2022-03-27 NOTE — PROGRESS NOTES
Problem: Dysphagia (Adult)  Goal: *Acute Goals and Plan of Care (Insert Text)  Description: Patient will:  1. Tolerate PO trials with 0 s/s overt distress in 4/5 trials  2. Utilize compensatory swallow strategies/maneuvers (decrease bite/sip, size/rate, alt. liq/sol) with min cues in 4/5 trials  3. Perform oral-motor/laryngeal exercises to increase oropharyngeal swallow function with min cues  4. Complete an objective swallow study (i.e., MBSS) to assess swallow integrity, r/o aspiration, and determine of safest LRD, min A      Rec:     Easy to Chew with Nectar/mildly thick liquids  Aspiration precautions  HOB >45 during po intake, remain >30 for 30-45 minutes after po   Small bites/sips; alternate liquid/solid with slow feeding rate   Oral care TID  Meds as tolerated      Outcome: Progressing Towards Goal     Problem: Motor Speech Impaired (Adult)  Goal: *Acute Goals and Plan of Care (Insert Text)  Description: 1. Utilize compensatory strategies (decrease rate, overarticulate, increase intensity) to increase intelligibility to >95% at conversational level with min A visual/verbal cues in 4/5 trials. 2. Perform oral motor exercises (with and without resistance) in therapy and at home to increase oral motor strength/range-of-motion for articulation tasks with min A with visual/verbal cues in 4/5 trials. 3. Complete articulatory agility tasks, specifically targeting /s/ and /s-blends/ (reading-conversation) with min A with visual/verbal cues in 4/5 trials. Outcome: Progressing Towards Goal     SPEECH LANGUAGE PATHOLOGY BEDSIDE SPEECH-LANGUAGE   AND SWALLOW EVALUATION    Patient: Dejuan Noriega (77 y.o. female)  Date: 3/27/2022  Primary Diagnosis: Stroke [facility]) [I63.9]        Precautions: Aspiration Precautions  PLOF: Per H&P    ASSESSMENT :  Based on the objective data described below, the patient presents with mild oropharyngeal dysphagia in the setting of CVA.     Swallow:  Chart Review and clearance obtained by RN. Today, SLP conducted a Clinical Beside Swallow Evaluation, per MD orders. Pt A&Ox4 and able to follow commands. Pt reports when brushing her teeth she noted significant spillage from L side of oral cavity and then slurred speech, specifically with /s/. Oral mech examination revealed structures functional for speech and deglutition. Pt observed with thin liquids +/- straw via single sips and successive swallows with timely swallow initiation,  Mildly restricted laryngeal elevation to palpation with immediate throat clears and delayed cough x1 with epiphora. Decrease is SPO2 with slight change in VQ also noted. Improved tolerance achieved with nectar/ mildly thick liquids +/- straw and in successive swallows without any overt s/sx of aspiration/penetration. Pt demo'd acceptance of pudding with functional oral prep. Slow, but + rotary chew and thorough oral clearance  was observed with regular solids; no s/sx aspiration witnessed. Pt reported last night when attempting to eat a taco (mixed consistency) she exhibiting coughing and ultimately made NPO until  dysphagia eval this morning. SLP RECS: Easy to [employer] with Nectar/ mildly thick liquids, meds as tolerated, with oral care TID, and standard aspiration precautions. ST will continue to follow and advance diet as deemed appropriate. Pt educated with regard to s/sx aspiration, aspiration risk, diet recs and role of SLP. Ptable to verbalize understanding. D/w RN. NPO wristband was removed. Speech:   Based on the objective data described below, the patient presents with mild dysarthria c/b by reduced coordination during diadochokinetic rates and distortions, specifically with /s/ and /s-blends,  impacting functional communication and independence.  Pt exhibited L Labial droop at rest and in labial retraction, however, not impacting buccal puff or labial seal. She further exhibited adequate breath support/ respiration[de-identified] speech coordinaton, prosody, and intonation. Overall speech intelligibility judged to be ~90% at the sentence/conversational level despite blended word boundaries and imprecise articulation. Given min cues and modeling, pt able to improve speech intelligibility with signifiantly less distortions with comp strategies: swallow more frequently, over-articulation, slow rate, and increased orality. Rec ST to follow to address motor speech deficits for dignity and functional communication. Results/recommendations discussed with pt who was able to verbalize understanding. Will continue to follow. Patient will benefit from skilled intervention to address the above impairments. Patient's rehabilitation potential is considered to be Excellent  Factors which may influence rehabilitation potential include:   []            None noted  []            Mental ability/status  [x]            Medical condition  []            Home/family situation and support systems  []            Safety awareness  []            Pain tolerance/management  []            Other:      PLAN :  Recommendations and Planned Interventions:  See above  Frequency/Duration: Patient will be followed by speech-language pathology 1-2 times per day/3-5 days per week to address goals. 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 OBJECTIVE:     Past Medical History:   Diagnosis Date    BMI 32.0-32.9,adult 3/26/2022    HTN (hypertension) 3/26/2022    Prediabetes 3/26/2022   History reviewed. No pertinent surgical history.   Home Situation:        Diet prior to admission: Regular with thin liquisd  Current Diet:  Easy to Chew with Nectar/ mildly thick liquids     Cognitive and Communication Status:  Neurologic State: Alert  Orientation Level: Oriented X4  Cognition: Follows commands  Perception: Appears intact  Perseveration: No perseveration noted  Safety/Judgement: Awareness of environment    Motor Speech:  Oral-Motor Structure/Motor Speech  Labial: Left droop  Dentition: Intact  Oral Hygiene: adequate  Lingual: Decreased rate  Velum: Unable to visualize  Mandible: No impairment  Apraxic Characteristics: None  Dysarthric Characteristics: Blended word boundaries; Imprecise  Intelligibility: Impaired  Word Intelligibility (%): 95 %  Phrase Intelligibility (%): 95 %  Sentence Intelligibility (%): 90 %  Conversation Intelligibility (%): 90 %  Overall Impairment Severity: Mild  Compensatory Strategies for Motor Speech:  (Slow rate over artciulate swallow more frequently speak loud)  Language Comprehension and Expression:  Neuro-Linguistics:     Pragmatics:        Voice:     Vocal Quality: Wet        Oral Assessment:  Oral Assessment  Labial: Left droop  Dentition: Intact  Oral Hygiene: adequate  Lingual: Decreased rate  Velum: Unable to visualize  Mandible: No impairment  Gag Reflex: No impairment  P.O. Trials:  Patient Position: 60  Vocal quality prior to P.O.: Wet  Consistency Presented: Pudding; Solid; Thin liquid  How Presented: Self-fed/presented;Cup/sip;Spoon;Straw;Successive swallows     Bolus Acceptance: No impairment  Bolus Formation/Control: Impaired  Type of Impairment: Delayed;Mastication  Propulsion: No impairment  Oral Residue: None  Initiation of Swallow: No impairment  Laryngeal Elevation: Decreased  Aspiration Signs/Symptoms: Change vocal quality; Decrease in O2 saturations;Delayed cough/throat clear;Strong cough; Watery eyes  Pharyngeal Phase Characteristics: Altered vocal quality; Suspected pharyngeal residue  Effective Modifications: Alternate liquids/solids;Small sips and bites; Other (comment)  Cues for Modifications: Minimal       Oral Phase Severity: Mild  Pharyngeal Phase Severity : Mild    PAIN:  Pain level pre-treatment: 0/10   Pain level post-treatment: 0/10   Pain Intervention(s): Medication (see MAR);  Rest, Ice, Repositioning   Response to intervention: Nurse notified, See doc flow    After Evaluation:   [] Patient left in no apparent distress sitting up in chair  [x]            Patient left in no apparent distress in bed  [x]            Call bell left within reach  [x]            Nursing notified  []            Family present  []            Caregiver present  []            Bed alarm activated    COMMUNICATION/EDUCATION:   [x]            Aspiration precautions; swallow safety; compensatory techniques. [x]            Receptive/Expressive communication strategies  [x]            Patient/family have participated as able in goal setting and plan of care. []            Patient/family agree to work toward stated goals and plan of care. []            Patient understands intent and goals of therapy; neutral about participation. []            Patient unable to participate in goal setting/plan of care; educ ongoing with interdisciplinary staff  [x]         Posted safety precautions in patient's room.       Thank you for this referral,  Zehra Rodriges, SLP   MA, CCC-SLP  Speech-Language Pathologist    Time Calculation: 30 mins  Speech Evaluation Time: 10 minutes  Swallow Evaluation Time: 20 minutes

## 2022-03-27 NOTE — CONSULTS
[facility]  Pulmonary, Critical Care, and Sleep Medicine    Name: Linh Marrero MRN: [id number]   : 1960 Hospital: [facility] [place]    Date: 3/27/2022  Room: 103/[address] [place] [address] Note                                              Consult requesting physician: Dr. Lilo Tucker  Reason for Consult: assisting with ICU care for CVA    IMPRESSION:   ·    Patient Active Problem List   Diagnosis Code    Stroke ([facility] [address] I63.9    BMI 32.0-32.9,adult Z68.32    Prediabetes R73.03    HTN (hypertension) I10   · Anemia, mild  · Code status: Full Code       RECOMMENDATIONS:     Respiratory: compensated respiratory status, on room air  No acute issues. Protecting airway  CXR 3/26/22 - no acute process  Keep SPO2 >=92%. HOB 30 degree elevation all the time. Pulmonary toileting as needed. Aspiration precautions. Incentive spirometry. CVS: permissive hypertension; goal SBP < 180 mm Hg and DBP < 105 mm Hg  EKG, troponin 3/26/22 normal  ECHO ordered    ID: no clinical evidence for sepsis    Hematology/Oncology: drop in Hgb likely dilutional   I/O not accurately. Patient has received > 1 + L of IVF since admission  No evidence for bleeding clinically externally    Renal: monitor renal functions, UO, lytes  Discussed with staff to closely follow and document I/O    GI/: avoid torrez placement. Voiding  Appreciate speech pathology evaluation  Diet as tolerated     Endocrine: Monitor BS.  SSI as needed    Neurology: discuss with Dr. Alex Hannon at bedside  Appreciate neurology input  S/p t-PA 3/26/22  Neuro/vascular checks per nursing protocol  RN aware to notify any changes in exam to neurology  Anti-platelet when ok with neurology  Await final report on CTA head and neck  Await MRI brain and later f/up CT head  Avoid any sedatives if possible    Psychiatry: none    Toxicology: none    Pain/Sedation: none3    Skin/Wound: per nursing protocol    Electrolytes: Replace electrolytes per ICU electrolyte replacement protocol    IVF: LR at 75 ml/hr    Nutrition: diet as tolerated    Prophylaxis: DVT Prophylaxis: SCD. GI Prophylaxis: Pepcid. Restraints: none    Lines/Tubes: PIV    PT/OT eval and treat. OOB when ok with neurology. Advance Directive/Palliative Care: Consult per clinical course. Full code    Will defer respective systems problem management to primary and other respective consultant and follow patient in ICU with primary and other medical team.  Further recommendations will be based on the patient's response to recommended treatment and results of the investigation ordered. Quality Care: PPI, DVT prophylaxis, HOB elevated, Infection control all reviewed and addressed. Care of plan d/w RN, RT, MDR, hospitalist team.  D/w patient (answered all questions to satisfaction). High complexity decision making was performed during the evaluation of this patient at high risk for decompensation with multiple organ involvement. Total critical care time spent rendering care exclusive of procedures/family discussion/coordination of care: 65 minutes. Subjective/History of Present Illness:   Patient is a 59 y.o. female with PMHx significant for HTN, obesity, prediabetic presented to [facility] ER after noticed LT side facial and arm weakness with slurred speech, minimal LT side blurred vision while brushing her tooth. In ER, code S called. She received t-PA per neurology evaluation after CT head. The patient denies any symptoms of dysphasia, or unilateral disturbance of sensory function, loss of balance or vertigo, HA, neck pain or stiffness, photophobia, chest pain, dyspnea, lightheadedness, palpitations, syncope, orthopnea, edema or paroxysmal nocturnal dyspnea.  The patient denies abnormal bruising or abnormal bleeding from any body orifice such as bleeding from nose or gums, blood in urine or stool, or melena, hemoptysis or hematemesis, fever, chills, adenopathy, sore throat, nausea, vomiting, abdominal pain. She is admitted to ICU post-t-PA and seen at bedside in ICU rm # [id number]. She feels improvement in her LT side motor weakness and resolved blurred vision on LT side. She feels dropping phone from LT hand easily. I/O last 24 hrs: Intake/Output Summary (Last 24 hours) at 3/27/2022 0839  Last data filed at 3/27/2022 0000  Gross per 24 hour   Intake 77.8 ml   Output 150 ml   Net -72.2 ml         History taken from patient, EMR    Review of Systems:  A comprehensive review of systems was negative except for that written in the HPI. No Known Allergies     Past Medical History:   Diagnosis Date    BMI 32.0-32.9,adult 3/26/2022    HTN (hypertension) 3/26/2022    Prediabetes 3/26/2022      History reviewed. No pertinent surgical history.      Social History     Tobacco Use    Smoking status: Not on file    Smokeless tobacco: Not on file   Substance Use Topics    Alcohol use: Not on file      Family History   Problem Relation Age of Onset    Stroke Mother       Prior to Admission medications    Not on File     Current Facility-Administered Medications   Medication Dose Route Frequency    sodium chloride (NS) flush 5-40 mL  5-40 mL IntraVENous Q8H    atorvastatin (LIPITOR) tablet 80 mg  80 mg Oral QHS    sodium chloride (NS) flush 5-40 mL  5-40 mL IntraVENous Q8H    famotidine (PF) (PEPCID) 20 mg in 0.9% sodium chloride 10 mL injection  20 mg IntraVENous BID    insulin lispro (HUMALOG) injection   SubCUTAneous Q6H    LORazepam (ATIVAN) injection 1 mg  1 mg IntraVENous ONCE    lactated Ringers infusion  75 mL/hr IntraVENous CONTINUOUS         Objective:   Vital Signs:    Visit Vitals  BP (!) 154/77   Pulse 71   Temp 98 °F (36.7 °C)   Resp 25   Ht 5' 4\" (1.626 m)   Wt 86.2 kg (190 lb)   SpO2 97%   Breastfeeding No   BMI 32.61 kg/m²               Temp (24hrs), Av.1 °F (36.7 °C), Min:97.8 °F (36.6 °C), Max:98.7 °F (37.1 °C)       Intake/Output:   Last shift:      No intake/output data recorded. Last 3 shifts: 03/25 1901 - 03/27 0700  In: 77.8 [I.V.:77.8]  Out: 150 [Urine:150]      Intake/Output Summary (Last 24 hours) at 3/27/2022 0839  Last data filed at 3/27/2022 0000  Gross per 24 hour   Intake 77.8 ml   Output 150 ml   Net -72.2 ml       Last 3 Recorded Weights in this Encounter    03/26/22 1840 03/26/22 1922   Weight: 87.1 kg (192 lb) 86.2 kg (190 lb)       No results for input(s): PHI, PHI, POC2, PCO2I, PO2, PO2I, HCO3, HCO3I, FIO2, FIO2I in the last 72 hours.     Physical Exam:     General: in no respiratory distress, AAO x 4, cooperative, appears stated age, on room air  HEENT: PERRLA, throat normal without erythema or exudate  Neck: No abnormally enlarged lymph nodes or thyroid, supple  Chest: normal  Lungs: moderate air entry, clear to auscultation bilaterally, normal percussion bilaterally, no tenderness/ rash  Heart: Regular rate and rhythm, S1S2 present, or without murmur or extra heart sounds  Abdomen: protuberant, bowel sounds normoactive, tympanic, abdomen is soft without significant tenderness, masses, organomegaly or guarding, rigidity, rebound  Extremity: no edema, no cyanosis; peripheral pulses 2+ and symmetric  Neuro: alert, oriented x 4, speech minimally slurred, memory intact grossly, LT side minimal facial droop, motor strength: LT UE 4/5, other extremitiesfull proximally and distally; no involuntary movements - tremors; sensation: intact to light touch; plantar responses: upgoing  Skin: Skin color, texture, turgor normal       Data:       Recent Results (from the past 24 hour(s))   GLUCOSE, POC    Collection Time: 03/26/22  6:24 PM   Result Value Ref Range    Glucose (POC) 117 (H) 70 - 942 mg/dL   METABOLIC PANEL, COMPREHENSIVE    Collection Time: 03/26/22  6:24 PM   Result Value Ref Range    Sodium 139 136 - 145 mmol/L    Potassium 3.9 3.5 - 5.5 mmol/L    Chloride 105 100 - 111 mmol/L    CO2 27 21 - 32 mmol/L    Anion gap 7 3.0 - 18 mmol/L    Glucose 132 (H) 74 - 99 mg/dL    BUN 18 7.0 - 18 MG/DL    Creatinine 1.19 0.6 - 1.3 MG/DL    BUN/Creatinine ratio 15 12 - 20      GFR est AA 56 (L) >60 ml/min/1.73m2    GFR est non-AA 46 (L) >60 ml/min/1.73m2    Calcium 9.5 8.5 - 10.1 MG/DL    Bilirubin, total 0.4 0.2 - 1.0 MG/DL    ALT (SGPT) 26 13 - 56 U/L    AST (SGOT) 21 10 - 38 U/L    Alk. phosphatase 105 45 - 117 U/L    Protein, total 8.2 6.4 - 8.2 g/dL    Albumin 4.0 3.4 - 5.0 g/dL    Globulin 4.2 (H) 2.0 - 4.0 g/dL    A-G Ratio 1.0 0.8 - 1.7     CBC WITH AUTOMATED DIFF    Collection Time: 03/26/22  6:25 PM   Result Value Ref Range    WBC 8.2 4.6 - 13.2 K/uL    RBC 5.37 (H) 4.20 - 5.30 M/uL    HGB 13.4 12.0 - 16.0 g/dL    HCT 43.6 35.0 - 45.0 %    MCV 81.2 78.0 - 100.0 FL    MCH 25.0 24.0 - 34.0 PG    MCHC 30.7 (L) 31.0 - 37.0 g/dL    RDW 14.2 11.6 - 14.5 %    PLATELET 994 723 - 140 K/uL    MPV 9.5 9.2 - 11.8 FL    NRBC 0.0 0  WBC    ABSOLUTE NRBC 0.00 0.00 - 0.01 K/uL    NEUTROPHILS 51 40 - 73 %    LYMPHOCYTES 41 21 - 52 %    MONOCYTES 7 3 - 10 %    EOSINOPHILS 1 0 - 5 %    BASOPHILS 1 0 - 2 %    IMMATURE GRANULOCYTES 0 0.0 - 0.5 %    ABS. NEUTROPHILS 4.2 1.8 - 8.0 K/UL    ABS. LYMPHOCYTES 3.3 0.9 - 3.6 K/UL    ABS. MONOCYTES 0.5 0.05 - 1.2 K/UL    ABS. EOSINOPHILS 0.1 0.0 - 0.4 K/UL    ABS. BASOPHILS 0.0 0.0 - 0.1 K/UL    ABS. IMM.  GRANS. 0.0 0.00 - 0.04 K/UL    DF AUTOMATED     TROPONIN-HIGH SENSITIVITY    Collection Time: 03/26/22  6:25 PM   Result Value Ref Range    Troponin-High Sensitivity 10 0 - 54 ng/L   EKG, 12 LEAD, INITIAL    Collection Time: 03/26/22  6:57 PM   Result Value Ref Range    Ventricular Rate 86 BPM    Atrial Rate 86 BPM    P-R Interval 172 ms    QRS Duration 74 ms    Q-T Interval 338 ms    QTC Calculation (Bezet) 404 ms    Calculated P Axis 57 degrees    Calculated R Axis 26 degrees    Calculated T Axis 52 degrees    Diagnosis       Normal sinus rhythm  Normal ECG  No previous ECGs available     DRUG SCREEN, URINE    Collection Time: 03/26/22  9:44 PM   Result Value Ref Range    BENZODIAZEPINES Negative NEG      BARBITURATES Negative NEG      THC (TH-CANNABINOL) Negative NEG      OPIATES Negative NEG      PCP(PHENCYCLIDINE) Negative NEG      COCAINE Negative NEG      AMPHETAMINES Negative NEG      METHADONE Negative NEG      HDSCOM (NOTE)    METABOLIC PANEL, COMPREHENSIVE    Collection Time: 03/27/22  5:26 AM   Result Value Ref Range    Sodium 140 136 - 145 mmol/L    Potassium 3.8 3.5 - 5.5 mmol/L    Chloride 106 100 - 111 mmol/L    CO2 31 21 - 32 mmol/L    Anion gap 3 3.0 - 18 mmol/L    Glucose 113 (H) 74 - 99 mg/dL    BUN 18 7.0 - 18 MG/DL    Creatinine 1.14 0.6 - 1.3 MG/DL    BUN/Creatinine ratio 16 12 - 20      GFR est AA 58 (L) >60 ml/min/1.73m2    GFR est non-AA 48 (L) >60 ml/min/1.73m2    Calcium 8.9 8.5 - 10.1 MG/DL    Bilirubin, total 0.2 0.2 - 1.0 MG/DL    ALT (SGPT) 21 13 - 56 U/L    AST (SGOT) 16 10 - 38 U/L    Alk. phosphatase 83 45 - 117 U/L    Protein, total 6.8 6.4 - 8.2 g/dL    Albumin 3.1 (L) 3.4 - 5.0 g/dL    Globulin 3.7 2.0 - 4.0 g/dL    A-G Ratio 0.8 0.8 - 1.7     CBC W/O DIFF    Collection Time: 03/27/22  5:26 AM   Result Value Ref Range    WBC 5.9 4.6 - 13.2 K/uL    RBC 4.44 4.20 - 5.30 M/uL    HGB 11.2 (L) 12.0 - 16.0 g/dL    HCT 36.7 35.0 - 45.0 %    MCV 82.7 78.0 - 100.0 FL    MCH 25.2 24.0 - 34.0 PG    MCHC 30.5 (L) 31.0 - 37.0 g/dL    RDW 14.3 11.6 - 14.5 %    PLATELET 179 593 - 668 K/uL    MPV 9.8 9.2 - 11.8 FL    NRBC 0.0 0  WBC    ABSOLUTE NRBC 0.00 0.00 - 0.01 K/uL         Chemistry Recent Labs     03/27/22  0526 03/26/22  1824   * 132*    139   K 3.8 3.9    105   CO2 31 27   BUN 18 18   CREA 1.14 1.19   CA 8.9 9.5   AGAP 3 7   BUCR 16 15   AP 83 105   TP 6.8 8.2   ALB 3.1* 4.0   GLOB 3.7 4.2*   AGRAT 0.8 1.0        Lactic Acid No results found for: LAC  No results for input(s): LAC in the last 72 hours.      Liver Enzymes Protein, total   Date Value Ref Range Status   03/27/2022 6.8 6.4 - 8.2 g/dL Final     Albumin   Date Value Ref Range Status   03/27/2022 3.1 (L) 3.4 - 5.0 g/dL Final     Globulin   Date Value Ref Range Status   03/27/2022 3.7 2.0 - 4.0 g/dL Final     A-G Ratio   Date Value Ref Range Status   03/27/2022 0.8 0.8 - 1.7   Final     Alk. phosphatase   Date Value Ref Range Status   03/27/2022 83 45 - 117 U/L Final     Recent Labs     03/27/22  0526 03/26/22  1824   TP 6.8 8.2   ALB 3.1* 4.0   GLOB 3.7 4.2*   AGRAT 0.8 1.0   AP 83 105        CBC w/Diff Recent Labs     03/27/22  0526 03/26/22  1825   WBC 5.9 8.2   RBC 4.44 5.37*   HGB 11.2* 13.4   HCT 36.7 43.6    288   GRANS  --  51   LYMPH  --  41   EOS  --  1        Cardiac Enzymes No results found for: CPK, CK, CKMMB, CKMB, RCK3, CKMBT, CKNDX, CKND1, GARY, TROPT, TROIQ, TORREY, TROPT, TNIPOC, BNP, BNPP     BNP No results found for: BNP, BNPP, XBNPT     Coagulation No results for input(s): PTP, INR, APTT, INREXT in the last 72 hours. Thyroid  No results found for: T4, T3U, TSH, TSHEXT    No results found for: T4     Urinalysis No results found for: COLOR, APPRN, SPGRU, REFSG, VANITA, PROTU, GLUCU, KETU, BILU, UROU, ALISA, LEUKU, GLUKE, EPSU, BACTU, WBCU, RBCU, CASTS, UCRY     Micro  No results for input(s): SDES, CULT in the last 72 hours. No results for input(s): CULT in the last 72 hours. Culture data during this hospitalization. All Micro Results     None             CT HEAD WO CONT    Result Date: 3/26/2022  EXAM: CT HEAD WO CONT CLINICAL INDICATION/HISTORY: code s -Additional: CODE S COMPARISON: None TECHNIQUE: Axial CT imaging of the head was performed without intravenous contrast. One or more dose reduction techniques were used on this CT: automated exposure control, adjustment of the mAs and/or kVp according to patient size, and iterative reconstruction techniques. The specific techniques used on this CT exam have been documented in the patient's electronic medical record.  Digital Imaging and Communications in Medicine (DICOM) format image data are available to nonaffiliated external healthcare facilities or entities on a secure, media free, reciprocally searchable basis with patient authorization for at least a 12-month period after this study. _______________ FINDINGS: BRAIN:   > Brain volume: Age appropriate.   > White matter: Little or no white matter disease. > Infarcts, encephalomalacia: Minimal lucency in the Left frontal lobe deep white matter lucency adjacent to the basal ganglia and superior insular white matter. > Parenchymal mass: None.   > Parenchymal hemorrhage: None.   > Midline shift: None.   > Miscellaneous: None. EXTRA-AXIAL SPACES: Unremarkable. No fluid collections. CALVARIUM: Intact. SINUSES, MASTOIDS: Clear. OTHER EXTRACRANIAL: Unremarkable. _______________     1. Age-indeterminate left frontal lobe deep white matter lucency, without mass effect. Differential considerations would include chronic small vessel ischemic changes versus white matter infarct. 2. No acute intra-axial hemorrhage. Code S report directly communicated to Kristen Baker on 3/26/2022 6:55 PM. Results understood and acknowledged. XR CHEST PORT    Result Date: 3/27/2022  --------------------------------------------------------------------------- <<<<<<<<<           [facility]           >>>>>>>>> --------------------------------------------------------------------------- CLINICAL HISTORY:  Cerebrovascular accident. COMPARISON EXAMINATIONS:  None. ---  SINGLE FRONTAL VIEW OF THE CHEST  --- The lungs and pleural spaces are clear. The mediastinum is unremarkable in appearance. No significant osseous abnormalities are identified.  --------------    -------------- No evidence for active cardiopulmonary disease.         PFT       Ultrasound       LE Doppler       ECHO       Images report reviewed by me:  CT (Most Recent) (CT chest reviewed by me) Results from Hospital Encounter encounter on 03/26/22    CT HEAD WO CONT    Narrative  EXAM: CT HEAD WO CONT    CLINICAL INDICATION/HISTORY: code s  -Additional: CODE S    COMPARISON: None    TECHNIQUE: Axial CT imaging of the head was performed without intravenous  contrast.  One or more dose reduction techniques were used on this CT: automated exposure  control, adjustment of the mAs and/or kVp according to patient size, and  iterative reconstruction techniques. The specific techniques used on this CT  exam have been documented in the patient's electronic medical record. Digital  Imaging and Communications in Medicine (DICOM) format image data are available  to nonaffiliated external healthcare facilities or entities on a secure, media  free, reciprocally searchable basis with patient authorization for at least a  12-month period after this study. _______________    FINDINGS:    BRAIN:  > Brain volume: Age appropriate.  > White matter: Little or no white matter disease. > Infarcts, encephalomalacia: Minimal lucency in the Left frontal lobe deep  white matter lucency adjacent to the basal ganglia and superior insular white  matter. > Parenchymal mass: None.  > Parenchymal hemorrhage: None.  > Midline shift: None.  > Miscellaneous: None. EXTRA-AXIAL SPACES: Unremarkable. No fluid collections. CALVARIUM: Intact. SINUSES, MASTOIDS: Clear. OTHER EXTRACRANIAL: Unremarkable.    _______________    Impression  1. Age-indeterminate left frontal lobe deep white matter lucency, without mass  effect. Differential considerations would include chronic small vessel ischemic  changes versus white matter infarct. 2. No acute intra-axial hemorrhage. Code S report directly communicated to Sandie Burgos on 3/26/2022 6:55 PM.  Results understood and acknowledged. CXR reviewed by me:  XR (Most Recent).  CXR  reviewed by me and compared with previous CXR Results from Hospital Encounter encounter on 03/26/22    XR CHEST PORT    Narrative  ---------------------------------------------------------------------------  <<<<<<<<<           [facility]           >>>>>>>>>  ---------------------------------------------------------------------------    CLINICAL HISTORY:  Cerebrovascular accident. COMPARISON EXAMINATIONS:  None. ---  SINGLE FRONTAL VIEW OF THE CHEST  ---    The lungs and pleural spaces are clear. The mediastinum is unremarkable in  appearance. No significant osseous abnormalities are identified.      --------------    Impression  --------------    No evidence for active cardiopulmonary disease. ·Please note: Voice-recognition software may have been used to generate this report, which may have resulted in some phonetic-based errors in grammar and contents. Even though attempts were made to correct all the mistakes, some may have been missed, and remained in the body of the document.       Tracee Prince MD  3/27/2022

## 2022-03-27 NOTE — CONSULTS
NEUROLOGY CONSULTATION NOTE    Patient: Alondra Galloway MRN: [id number]  CSN: [id number]    YOB: 1960  Age: 59 y.o. Sex: female    DOA: 3/26/2022 LOS:  LOS: 1 day        Requesting Physician: Dr. Mayank Tucker  Reason for Consultation: Stroke               HISTORY OF PRESENT ILLNESS:   Alondra Galloway is a 59 y.o. female with past medical history of hypertension presented to emergency room yesterday for left facial weakness, slurred speech and left upper limb weakness. Patient was evaluated by teleneurologist and received TPA. This morning, left arm weakness has improved. Stroke Work-up:  CT HEAD WO CONT Result Date: 3/26/2022  1. Age-indeterminate left frontal lobe deep white matter lucency, without mass effect. Differential considerations would include chronic small vessel ischemic changes versus white matter infarct. 2. No acute intra-axial hemorrhage. Code S report directly communicated to Dexter Yung on 3/26/2022 6:55 PM. Results understood and acknowledged. Brain MRI pending    CTA of head and neck: pending  Echocardiogram:  pending  Lipid panel: No results found for: CHOL, CHOLPOCT, CHOLX, CHLST, CHOLV, 089703, HDL, HDLP, LDL, LDLC, DLDLP, 822596, VLDLC, VLDL, TGLX, TRIGL, TRIGP, TGLPOCT, CHHD, CHHDX  HbA1c:   No results found for: HBA1C, HNC8LEUS, HKZ5QXLQ    PAST MEDICAL HISTORY:  Past Medical History:   Diagnosis Date    BMI 32.0-32.9,adult 3/26/2022    HTN (hypertension) 3/26/2022    Prediabetes 3/26/2022     PAST SURGICAL HISTORY:  History reviewed. No pertinent surgical history.   FAMILY HISTORY:  Family History   Problem Relation Age of Onset    Stroke Mother      SOCIAL HISTORY:  Social History     Socioeconomic History    Marital status:      MEDICATIONS:  Current Facility-Administered Medications   Medication Dose Route Frequency    sodium chloride (NS) flush 5-40 mL  5-40 mL IntraVENous Q8H    sodium chloride (NS) flush 5-40 mL  5-40 mL IntraVENous PRN    ondansetron (ZOFRAN) injection 4 mg  4 mg IntraVENous Q6H PRN    dextrose 10% infusion 0-250 mL  0-250 mL IntraVENous PRN    atorvastatin (LIPITOR) tablet 80 mg  80 mg Oral QHS    sodium chloride (NS) flush 5-40 mL  5-40 mL IntraVENous Q8H    sodium chloride (NS) flush 5-40 mL  5-40 mL IntraVENous PRN    acetaminophen (TYLENOL) tablet 650 mg  650 mg Oral Q6H PRN    Or    acetaminophen (TYLENOL) suppository 650 mg  650 mg Rectal Q6H PRN    polyethylene glycol (MIRALAX) packet 17 g  17 g Oral DAILY PRN    famotidine (PF) (PEPCID) 20 mg in 0.9% sodium chloride 10 mL injection  20 mg IntraVENous BID    insulin lispro (HUMALOG) injection   SubCUTAneous Q6H    glucose chewable tablet 16 g  16 g Oral PRN    glucagon (GLUCAGEN) injection 1 mg  1 mg IntraMUSCular PRN    dextrose 10% infusion 0-250 mL  0-250 mL IntraVENous PRN    LORazepam (ATIVAN) injection 1 mg  1 mg IntraVENous ONCE    lactated Ringers infusion  75 mL/hr IntraVENous CONTINUOUS     Prior to Admission medications    Not on File       ALLERGIES:  No Known Allergies    Review of Systems  GENERAL: No fevers or chills. HEENT: No change in vision, earache, tinnitus, sore throat or sinus congestion. NECK: No pain or stiffness. CARDIOVASCULAR: No chest pain or pressure. No palpitations. PULMONARY: No shortness of breath, cough or wheeze. GASTROINTESTINAL: No abdominal pain, nausea, vomiting or diarrhea. GENITOURINARY: No urinary frequency, urgency, hesitancy or dysuria. MUSCULOSKELETAL: No joint or muscle pain, no back pain, no recent trauma. DERMATOLOGIC: No rash, no itching, no lesions. ENDOCRINE: No polyuria, polydipsia, no heat or cold intolerance. No recent change in weight. HEMATOLOGICAL: No anemia or easy bruising or bleeding. NEUROLOGIC: Facial weakness and left arm weakness.     PHYSICAL EXAMINATION:     Visit Vitals  BP (!) 135/55   Pulse 62   Temp 98.3 °F (36.8 °C)   Resp 16   Ht 5' 4\" (1.626 m)   Wt 86.2 kg (190 lb)   SpO2 94%   Breastfeeding No   BMI 32.61 kg/m²         GENERAL: Pleasant, in no apparent distress. HEENT: Moist mucous membranes, sclerae anicteric, scalp is atraumatic. CVS: Regular rate and rhythm, no murmurs or gallops. No carotid bruits. PULMONARY: Clear to auscultation bilaterally. No rales or rhonchi. No wheezing. EXTREMITIES: Normal range of motion at all sites. No deformities. ABDOMEN: Soft, nontender. SKIN: No rashes or ecchymoses. Warm and dry. NEUROLOGIC: Alert and oriented x3. Mild dysarthria. Cranial nerves: Left facial weakness. Facial sensation is intact. Extraocular movements are intact with no nystagmus. Visual fields are full to confrontation. PERRL. Tongue is midline. Palate elevates symmetrically. Hearing intact to speech. Sternocleidomastoid and trapezius strengths are full bilaterally. Motor: Weakness in left hand  and bradykinesia. Strength is full left lower limb and  right sided upper and the lower limbs. Sensory: Intact to pinprick and touch on all four. Normal vibratory sensation on toes bilaterally. Coordination: Intact coordination with finger-nose-finger bilaterally. Normal fine movements. No bradykinesia detected. Deep tendon reflexes: 2+ at biceps, brachioradialis, patella and ankles bilaterally. Toes are down-going bilaterally. Gait assessment: Deferred. Labs: Results:       Chemistry Recent Labs     03/27/22  0526 03/26/22  1824   * 132*    139   K 3.8 3.9    105   CO2 31 27   BUN 18 18   CREA 1.14 1.19   CA 8.9 9.5   AGAP 3 7   BUCR 16 15   AP 83 105   TP 6.8 8.2   ALB 3.1* 4.0   GLOB 3.7 4.2*   AGRAT 0.8 1.0      CBC w/Diff Recent Labs     03/27/22  0526 03/26/22  1825   WBC 5.9 8.2   RBC 4.44 5.37*   HGB 11.2* 13.4   HCT 36.7 43.6    288   GRANS  --  51   LYMPH  --  41   EOS  --  1      Cardiac Enzymes No results for input(s): CPK, CKND1, GARY in the last 72 hours.     No lab exists for component: CKRMB, TROIP   Coagulation No results for input(s): PTP, INR, APTT, INREXT in the last 72 hours. Lipid Panel No results found for: CHOL, CHOLPOCT, CHOLX, CHLST, CHOLV, 267522, HDL, HDLP, LDL, LDLC, DLDLP, 721868, VLDLC, VLDL, TGLX, TRIGL, TRIGP, TGLPOCT, CHHD, CHHDX   BNP No results for input(s): BNPP in the last 72 hours. Liver Enzymes Recent Labs     03/27/22  0526   TP 6.8   ALB 3.1*   AP 83      Thyroid Studies No results found for: T4, T3U, TSH, TSHEXT       Radiology:  CT HEAD WO CONT    Result Date: 3/26/2022  EXAM: CT HEAD WO CONT CLINICAL INDICATION/HISTORY: code s -Additional: CODE S COMPARISON: None TECHNIQUE: Axial CT imaging of the head was performed without intravenous contrast. One or more dose reduction techniques were used on this CT: automated exposure control, adjustment of the mAs and/or kVp according to patient size, and iterative reconstruction techniques. The specific techniques used on this CT exam have been documented in the patient's electronic medical record. Digital Imaging and Communications in Medicine (DICOM) format image data are available to nonaffiliated external healthcare facilities or entities on a secure, media free, reciprocally searchable basis with patient authorization for at least a 12-month period after this study. _______________ FINDINGS: BRAIN:   > Brain volume: Age appropriate.   > White matter: Little or no white matter disease. > Infarcts, encephalomalacia: Minimal lucency in the Left frontal lobe deep white matter lucency adjacent to the basal ganglia and superior insular white matter. > Parenchymal mass: None.   > Parenchymal hemorrhage: None.   > Midline shift: None.   > Miscellaneous: None. EXTRA-AXIAL SPACES: Unremarkable. No fluid collections. CALVARIUM: Intact. SINUSES, MASTOIDS: Clear. OTHER EXTRACRANIAL: Unremarkable. _______________     1. Age-indeterminate left frontal lobe deep white matter lucency, without mass effect.  Differential considerations would include chronic small vessel ischemic changes versus white matter infarct. 2. No acute intra-axial hemorrhage. Code S report directly communicated to Sean Elizalde on 3/26/2022 6:55 PM. Results understood and acknowledged. XR CHEST PORT    Result Date: 3/27/2022  --------------------------------------------------------------------------- <<<<<<<<<           [facility]           >>>>>>>>> --------------------------------------------------------------------------- CLINICAL HISTORY:  Cerebrovascular accident. COMPARISON EXAMINATIONS:  None. ---  SINGLE FRONTAL VIEW OF THE CHEST  --- The lungs and pleural spaces are clear. The mediastinum is unremarkable in appearance. No significant osseous abnormalities are identified.  --------------    -------------- No evidence for active cardiopulmonary disease. ASSESSMENT/IMPRESSION:  56-year-old female with past medical history of hypertension presents with left facial droop and left arm weakness status post TPA. Symptoms have improved at this morning. 1. Acute ischemic infarct. Status post TPA around 8 PM last night. Risk factors are hypertension. Pending stroke work-ups      RECOMMENDATIONS:  1. Start aspirin 81 mg 24 hours after TPA if repeated head CT rule out hemorrhage. 2. Continue atorvastatin 80 mg daily. 3. Repeat head CT 24 hours after TPA. 4. Permissive hypertension blood pressure less than 220/1 10 mmHg. 5. Transfer to telemetry 24 hours after TPA if head CT rule out hemorrhage. I will follow the patient.  Please do not hesitate to return with any questions.    ------------------------------------  Iesha Molina MD  3/27/2022  9:42 AM

## 2022-03-27 NOTE — PROGRESS NOTES
Physical Therapy Attempt    Chart reviewed. Ptunable to participate in physical therapy session due to:    []  Eating meal  []  Nausea  []  Dizziness  []  Lethargy  []  Lab Results  []  Blood Transfusion  []  Dialysis  []  Pain  [x]  Other:  TPA to end at 1130. Pt to have MRI today. Will attempt later today/tomorrow as pt schedule allows.     Jorge Zimmerman PT

## 2022-03-27 NOTE — PROGRESS NOTES
OT evaluation attempted, pt on TPA to end at 1130, awaiting MRI, will follow up for eval tomorrow  Tarun Dunne OTD,OTR/L

## 2022-03-27 NOTE — PROGRESS NOTES
D/C plan: home vs home w/ HH. Family to transport. Chart review completed: Pt is independent in all ADLs and IDLs at baseline. Pt is an ER nurse. MRI pending. PT/OT to eval prior to d/c. Anticipate d/c home vs home w/ HH. CM to follow to assist w/ d/c planning. Reason for Admission:  Per H&P: Meghna Araujo is a 59 y.o. female with hypertension, prediabetes, and obesity presents to the ED with stroke-like symptoms beginning tonight at 1700. 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 She was brushing her teeth and noted that the toothpaste was drooling out the corner of her mouth. She also had onset of left arm weakness, slurred speech, facial numbness, dizziness and blurred vision in the left eye at the same time. She did not have any impairment in coordination or gait. She drove herself to work tonight and immediately was triaged and placed in a bed for stroke evaluation. She was given TPA at the recommendation of teleneurology and her symptoms have slightly improved. She had some spots in her vision and a decrease in her maximal heart rate while exercising yesterday. She typically exercises every day and has not had any symptoms prior to this. She decided not to work out today and just slept in preparation for her shift. She does not regularly take an aspirin or any anticoagulation. She denies any headache, fever, cough, shortness of breath, chest pain, nausea, vomitng, diarrhea, or leg swelling. \"                     RUR Score:          11%           Plan for utilizing home health:     TBD     PCP: First and Last name:  Evelio Gore MD     Name of Practice:    Are you a current patient: Yes/No:    Approximate date of last visit:    Can you participate in a virtual visit with your PCP:                     Current Advanced Directive/Advance Care Plan: Full Code      Healthcare Decision Maker:   Click here to complete [address] including selection of the Healthcare Decision Maker Relationship (ie \"Primary\")                             Transition of Care Plan:   D/C plan: TBD: PT and OT evals pending. Anticipate d/c home w/ [place] vs home. Care Management Interventions  PCP Verified by CM: Yes (Via chart review: Dr. Clotilde Tobin )  Palliative Care Criteria Met (RRAT>21 & CHF Dx)?: No  Mode of Transport at Discharge: Other (see comment) (Family. TBD)  Transition of Care Consult (CM Consult):  Other (Home vs home w/ HH. )  Discharge Durable Medical Equipment:  (TBD)  Health Maintenance Reviewed: Yes  Physical Therapy Consult: Yes  Occupational Therapy Consult: Yes  Speech Therapy Consult: Yes  Support Systems: Child(cristhian)  Confirm Follow Up Transport: Family  The Plan for Transition of Care is Related to the Following Treatment Goals : home vs home w/ [place]  Discharge Location  Patient Expects to be Discharged to[de-identified]  (TBD: Home vs home w/ HH. )

## 2022-03-27 NOTE — PROGRESS NOTES
Contacted Dr. Leopoldo Reveal concerning patient's ordered MRI. MRI can be done any time today. [phone] paged MRI staff for patient's ordered MRI. Waiting on return call. [phone] return call from [facility] (MRI staff).

## 2022-03-27 NOTE — ED NOTES
Last Known Well @ 1700    Pt endorses brushing teeth when began noting L sided facial droop/drooling. Endorses slurred speech/L sided weakness on arrival to ED; Unable to take PO BP meds this prior to arrival r/t \"I couldn't swallow\".      Hx HTN-Takes PO meds daily, no previous CVA/TIA

## 2022-03-27 NOTE — ED NOTES
TRANSFER - OUT REPORT:    Verbal report given to Angelina Romeo RN (name) on Amandeep Miranda  being transferred to ICU (unit) for routine progression of care       Report consisted of patients Situation, Background, Assessment and   Recommendations(SBAR). Information from the following report(s) SBAR, ED Summary, Procedure Summary, Intake/Output and MAR was reviewed with the receiving nurse. Lines:   Peripheral IV 03/26/22 Left Antecubital (Active)   Site Assessment Clean, dry, & intact 03/26/22 1828   Phlebitis Assessment 0 03/26/22 1828   Infiltration Assessment 0 03/26/22 1828   Dressing Status Clean, dry, & intact 03/26/22 1828   Dressing Type Transparent 03/26/22 1828   Hub Color/Line Status Pink;Flushed;Patent 03/26/22 1828   Action Taken Blood drawn 03/26/22 1828       Peripheral IV 03/26/22 Right Antecubital (Active)   Site Assessment Clean, dry, & intact 03/26/22 1915   Phlebitis Assessment 0 03/26/22 1915   Infiltration Assessment 0 03/26/22 1915   Dressing Status Clean, dry, & intact 03/26/22 1915   Dressing Type Tape;Transparent 03/26/22 1915   Hub Color/Line Status Pink;Flushed;Patent 03/26/22 1915        Opportunity for questions and clarification was provided.       Patient transported with:   Monitor, RN, [employer]

## 2022-03-27 NOTE — PROGRESS NOTES
2125-Verbal report received from [facility] GILBERTO Escobar RN in ED. Pt to be transferred to ICU upon completion of neuro check. Sbar, mar, labs, kardex and patient status reviewed. 2158-Pt received from ED via stretcher at this time with Ed staff. Transferred to bed from stretcher with minimal assist. Placed on monitor and oriented to room and call bell. Dual RN Neuro assessment performed at handoff. 2200-Assessment completed. Full neuro assessment completed. No change in slight left facial droop, minimal slurred speech, and left arm slightly weaker than right. Alert and oriented x4. No complaints at this time. 2230-Neuro assessment completed without change. 2300-Neuro assessment completed without change. 2330-Neuro assessment completed.  equal at this time and facial droop is less pronounced. Minimal Facial droop noted. Bedside swallow study performed at this time. Passed ice chips, water without straw, water with straw, and applesauce. Per Dr. Hein Lias may advance diet as tolerated. Tolerated pills without difficulty. 0000-Assessment completed. No changes. 0030-Neuro Assessment completed. Pt attempting to eat food from outside hospital and began coughing with harder texture food. Made NPO at this time and speech consult placed per protocol. 0100-Neuro assessment completed. No changes    0130-Neuro Assessment completed. Out of bed to bedside commode to void without difficulty. 0200-Neuro assessment completed. No changes    0230-Neuro assessment completed. No changes. 0300-Neuro Assessment completed. No changes. 0330-Neuro Assessment completed. No changes. 0400-Neuro Assessment completed. Completed x6 30mins neuro assessments at this time. Will now continue to monitor hourly. 0500-Neuro assessment completed. Labs drawn per peripheral IV to Rt AV without difficulty. Continue no sticks at this time. 0600-Neuro assessment completed. Care continues.      0715-Bedside verbal report given to Ignacio Elmore RN. Sbar,mar, labs ,kardex and patient status reviewed. Dual RN NIH assessment completed.

## 2022-03-27 NOTE — PROGRESS NOTES
Speech Pathology Note:      ST eval completed; rec: Easy to Chew with Nectar/ mildly thick liquids. ST to follow.        Thank You for this referral,  Lisa Dewey, [id number] [facility]  Speech Language Pathologist

## 2022-03-27 NOTE — ED NOTES
TPA infusion complete, NS initiated per protocol. Continues to present with mild L UE drift/slurred speech/L sided facial droop. Denies HA/vision changes/CP/SOB.

## 2022-03-27 NOTE — PROGRESS NOTES
Patient's daughter notified nursing of worsening facial droop. Nurse to assess patient. Patient has left-sided facial drooping that has slightly increased based of off previous assessments. Patient stated that she was trying to drink and she was unable to hold the liquid in the left side of her mouth. Dr. Rory Padilla notified of this finding. Stat CT order placed. Tracey Padilla to give Post alteplase CT scan results. Not able to speak to physician at this time.

## 2022-03-28 ENCOUNTER — APPOINTMENT (OUTPATIENT)
Dept: NON INVASIVE DIAGNOSTICS | Age: 62
DRG: 063 | End: 2022-03-28
Attending: FAMILY MEDICINE
Payer: COMMERCIAL

## 2022-03-28 ENCOUNTER — APPOINTMENT (OUTPATIENT)
Dept: VASCULAR SURGERY | Age: 62
DRG: 063 | End: 2022-03-28
Attending: SURGERY
Payer: COMMERCIAL

## 2022-03-28 LAB
ALBUMIN SERPL-MCNC: 3.1 G/DL (ref 3.4–5)
ALBUMIN/GLOB SERPL: 0.8 {RATIO} (ref 0.8–1.7)
ALP SERPL-CCNC: 82 U/L (ref 45–117)
ALT SERPL-CCNC: 20 U/L (ref 13–56)
ANION GAP SERPL CALC-SCNC: 4 MMOL/L (ref 3–18)
AST SERPL-CCNC: 14 U/L (ref 10–38)
BILIRUB SERPL-MCNC: 0.3 MG/DL (ref 0.2–1)
BUN SERPL-MCNC: 16 MG/DL (ref 7–18)
BUN/CREAT SERPL: 16 (ref 12–20)
CALCIUM SERPL-MCNC: 8.9 MG/DL (ref 8.5–10.1)
CHLORIDE SERPL-SCNC: 107 MMOL/L (ref 100–111)
CO2 SERPL-SCNC: 28 MMOL/L (ref 21–32)
CREAT SERPL-MCNC: 1 MG/DL (ref 0.6–1.3)
ECHO AO ROOT DIAM: 2.9 CM
ECHO AO ROOT INDEX: 1.52 CM/M2
ECHO AV AREA PEAK VELOCITY: 1.8 CM2
ECHO AV AREA VTI: 1.8 CM2
ECHO AV AREA/BSA PEAK VELOCITY: 0.9 CM2/M2
ECHO AV AREA/BSA VTI: 0.9 CM2/M2
ECHO AV MEAN GRADIENT: 5 MMHG
ECHO AV MEAN VELOCITY: 1.1 M/S
ECHO AV PEAK GRADIENT: 9 MMHG
ECHO AV PEAK VELOCITY: 1.5 M/S
ECHO AV VELOCITY RATIO: 0.6
ECHO AV VTI: 38.1 CM
ECHO EST RA PRESSURE: 8 MMHG
ECHO LA DIAMETER INDEX: 1.88 CM/M2
ECHO LA DIAMETER: 3.6 CM
ECHO LA TO AORTIC ROOT RATIO: 1.24
ECHO LA VOL 2C: 17 ML (ref 22–52)
ECHO LA VOL 4C: 33 ML (ref 22–52)
ECHO LA VOL BP: 27 ML (ref 22–52)
ECHO LA VOL/BSA BIPLANE: 14 ML/M2 (ref 16–34)
ECHO LA VOLUME AREA LENGTH: 30 ML
ECHO LA VOLUME INDEX A2C: 9 ML/M2 (ref 16–34)
ECHO LA VOLUME INDEX A4C: 17 ML/M2 (ref 16–34)
ECHO LA VOLUME INDEX AREA LENGTH: 16 ML/M2 (ref 16–34)
ECHO LV E' LATERAL VELOCITY: 7 CM/S
ECHO LV E' SEPTAL VELOCITY: 7 CM/S
ECHO LV EDV A2C: 72 ML
ECHO LV EDV A4C: 93 ML
ECHO LV EDV BP: 83 ML (ref 56–104)
ECHO LV EDV INDEX A4C: 49 ML/M2
ECHO LV EDV INDEX BP: 43 ML/M2
ECHO LV EDV NDEX A2C: 38 ML/M2
ECHO LV EJECTION FRACTION A2C: 61 %
ECHO LV EJECTION FRACTION A4C: 63 %
ECHO LV EJECTION FRACTION BIPLANE: 62 % (ref 55–100)
ECHO LV ESV A2C: 28 ML
ECHO LV ESV A4C: 35 ML
ECHO LV ESV BP: 31 ML (ref 19–49)
ECHO LV ESV INDEX A2C: 15 ML/M2
ECHO LV ESV INDEX A4C: 18 ML/M2
ECHO LV ESV INDEX BP: 16 ML/M2
ECHO LV FRACTIONAL SHORTENING: 29 % (ref 28–44)
ECHO LV GLOBAL LONGITUDINAL STRAIN (GLS): -18.7 %
ECHO LV INTERNAL DIMENSION DIASTOLE INDEX: 2.36 CM/M2
ECHO LV INTERNAL DIMENSION DIASTOLIC: 4.5 CM (ref 3.9–5.3)
ECHO LV INTERNAL DIMENSION SYSTOLIC INDEX: 1.68 CM/M2
ECHO LV INTERNAL DIMENSION SYSTOLIC: 3.2 CM
ECHO LV IVSD: 1.1 CM (ref 0.6–0.9)
ECHO LV MASS 2D: 164 G (ref 67–162)
ECHO LV MASS INDEX 2D: 85.9 G/M2 (ref 43–95)
ECHO LV POSTERIOR WALL DIASTOLIC: 1 CM (ref 0.6–0.9)
ECHO LV RELATIVE WALL THICKNESS RATIO: 0.44
ECHO LVOT AREA: 3.1 CM2
ECHO LVOT AV VTI INDEX: 0.6
ECHO LVOT DIAM: 2 CM
ECHO LVOT MEAN GRADIENT: 2 MMHG
ECHO LVOT PEAK GRADIENT: 3 MMHG
ECHO LVOT PEAK VELOCITY: 0.9 M/S
ECHO LVOT STROKE VOLUME INDEX: 37.5 ML/M2
ECHO LVOT SV: 71.6 ML
ECHO LVOT VTI: 22.8 CM
ECHO MV A VELOCITY: 1.09 M/S
ECHO MV E DECELERATION TIME (DT): 269.7 MS
ECHO MV E VELOCITY: 0.83 M/S
ECHO MV E/A RATIO: 0.76
ECHO MV E/E' LATERAL: 11.86
ECHO MV E/E' RATIO (AVERAGED): 11.86
ECHO MV E/E' SEPTAL: 11.86
ECHO PULMONARY ARTERY END DIASTOLIC PRESSURE: 5 MMHG
ECHO PV REGURGITANT MAX VELOCITY: 1.2 M/S
ECHO RIGHT VENTRICULAR SYSTOLIC PRESSURE (RVSP): 36 MMHG
ECHO RV FREE WALL PEAK S': 14 CM/S
ECHO RV INTERNAL DIMENSION: 3.1 CM
ECHO RV TAPSE: 2.7 CM (ref 1.5–2)
ECHO TV REGURGITANT MAX VELOCITY: 2.65 M/S
ECHO TV REGURGITANT PEAK GRADIENT: 28 MMHG
ERYTHROCYTE [DISTWIDTH] IN BLOOD BY AUTOMATED COUNT: 14.3 % (ref 11.6–14.5)
GLOBULIN SER CALC-MCNC: 3.7 G/DL (ref 2–4)
GLUCOSE BLD STRIP.AUTO-MCNC: 106 MG/DL (ref 70–110)
GLUCOSE BLD STRIP.AUTO-MCNC: 127 MG/DL (ref 70–110)
GLUCOSE BLD STRIP.AUTO-MCNC: 94 MG/DL (ref 70–110)
GLUCOSE SERPL-MCNC: 116 MG/DL (ref 74–99)
HCT VFR BLD AUTO: 35 % (ref 35–45)
HGB BLD-MCNC: 10.9 G/DL (ref 12–16)
MCH RBC QN AUTO: 25.2 PG (ref 24–34)
MCHC RBC AUTO-ENTMCNC: 31.1 G/DL (ref 31–37)
MCV RBC AUTO: 80.8 FL (ref 78–100)
NRBC # BLD: 0 K/UL (ref 0–0.01)
NRBC BLD-RTO: 0 PER 100 WBC
PLATELET # BLD AUTO: 244 K/UL (ref 135–420)
PMV BLD AUTO: 9.9 FL (ref 9.2–11.8)
POTASSIUM SERPL-SCNC: 4.2 MMOL/L (ref 3.5–5.5)
PROT SERPL-MCNC: 6.8 G/DL (ref 6.4–8.2)
RBC # BLD AUTO: 4.33 M/UL (ref 4.2–5.3)
SODIUM SERPL-SCNC: 139 MMOL/L (ref 136–145)
WBC # BLD AUTO: 5.6 K/UL (ref 4.6–13.2)

## 2022-03-28 PROCEDURE — 93306 TTE W/DOPPLER COMPLETE: CPT

## 2022-03-28 PROCEDURE — 65660000000 HC RM CCU STEPDOWN

## 2022-03-28 PROCEDURE — 97116 GAIT TRAINING THERAPY: CPT

## 2022-03-28 PROCEDURE — 97530 THERAPEUTIC ACTIVITIES: CPT

## 2022-03-28 PROCEDURE — 74011250636 HC RX REV CODE- 250/636: Performed by: FAMILY MEDICINE

## 2022-03-28 PROCEDURE — 93880 EXTRACRANIAL BILAT STUDY: CPT

## 2022-03-28 PROCEDURE — 74011250637 HC RX REV CODE- 250/637: Performed by: EMERGENCY MEDICINE

## 2022-03-28 PROCEDURE — 82962 GLUCOSE BLOOD TEST: CPT

## 2022-03-28 PROCEDURE — 36415 COLL VENOUS BLD VENIPUNCTURE: CPT

## 2022-03-28 PROCEDURE — 97535 SELF CARE MNGMENT TRAINING: CPT

## 2022-03-28 PROCEDURE — 74011000250 HC RX REV CODE- 250: Performed by: EMERGENCY MEDICINE

## 2022-03-28 PROCEDURE — 97161 PT EVAL LOW COMPLEX 20 MIN: CPT

## 2022-03-28 PROCEDURE — 74011000250 HC RX REV CODE- 250: Performed by: FAMILY MEDICINE

## 2022-03-28 PROCEDURE — 97166 OT EVAL MOD COMPLEX 45 MIN: CPT

## 2022-03-28 PROCEDURE — 85027 COMPLETE CBC AUTOMATED: CPT

## 2022-03-28 PROCEDURE — 74011250637 HC RX REV CODE- 250/637: Performed by: INTERNAL MEDICINE

## 2022-03-28 PROCEDURE — 80053 COMPREHEN METABOLIC PANEL: CPT

## 2022-03-28 PROCEDURE — 92526 ORAL FUNCTION THERAPY: CPT

## 2022-03-28 RX ORDER — GUAIFENESIN 100 MG/5ML
81 LIQUID (ML) ORAL DAILY
Status: DISCONTINUED | OUTPATIENT
Start: 2022-03-28 | End: 2022-03-29 | Stop reason: HOSPADM

## 2022-03-28 RX ADMIN — FAMOTIDINE 20 MG: 10 INJECTION, SOLUTION INTRAVENOUS at 08:33

## 2022-03-28 RX ADMIN — SODIUM CHLORIDE, PRESERVATIVE FREE 10 ML: 5 INJECTION INTRAVENOUS at 14:43

## 2022-03-28 RX ADMIN — SODIUM CHLORIDE, PRESERVATIVE FREE 10 ML: 5 INJECTION INTRAVENOUS at 06:34

## 2022-03-28 RX ADMIN — SODIUM CHLORIDE, PRESERVATIVE FREE 20 ML: 5 INJECTION INTRAVENOUS at 05:45

## 2022-03-28 RX ADMIN — ASPIRIN 81 MG: 81 TABLET, CHEWABLE ORAL at 11:30

## 2022-03-28 RX ADMIN — FAMOTIDINE 20 MG: 10 INJECTION, SOLUTION INTRAVENOUS at 21:00

## 2022-03-28 RX ADMIN — SODIUM CHLORIDE, PRESERVATIVE FREE 10 ML: 5 INJECTION INTRAVENOUS at 21:50

## 2022-03-28 RX ADMIN — ATORVASTATIN CALCIUM 80 MG: 20 TABLET, FILM COATED ORAL at 21:48

## 2022-03-28 NOTE — DIABETES MGMT
GLYCEMIC CONTROL PROGRESS NOTE:    - no known h/o DM, HbA1C meets criteria for prediabetes   - admitted for CVA  - BG within target range < 180 mg/dL  - pt reports h/o prediabetes  - see GC RN ed note  Lab Results   Component Value Date/Time    Hemoglobin A1c 6.0 (H) 03/27/2022 05:26 AM         Recent Glucose Results:   Lab Results   Component Value Date/Time     (H) 03/28/2022 04:21 AM    GLUCPOC 127 (H) 03/28/2022 11:31 AM    GLUCPOC 106 03/28/2022 08:36 AM    GLUCPOC 83 03/27/2022 09:04 PM     Ledy Oneil MS, RN, CDE  Glycemic Control Team  [phone]

## 2022-03-28 NOTE — PROGRESS NOTES
Bedside and Verbal shift change report given to SHIVANI Epps (oncoming nurse) by Bel Blanchard RN (offgoing nurse). Report included the following information SBAR, Kardex, Intake/Output, MAR, Recent Results and Cardiac Rhythm SR. Pt is Ax0x4, afebrile, denies pain and SOB, VSS on RA, assessment documented per relevant flow sheet, neuro assessment completed NIH completed per shift, NIH score 3, slurred speech and left arm weakness and slight drift noted, critical care continues    Transfer orders received, Nurse Supervisor called with bed assignment on TELE unit for routine progression of care, reached out to give report, receiving nurse currently in pt room, will call back when she is available    TRANSFER - OUT REPORT:    Verbal report given to Gunnar Corbett RN(name) on [facility]  being transferred to [place], RN(unit) for routine progression of care       Report consisted of patients Situation, Background, Assessment and   Recommendations(SBAR). Information from the following report(s) SBAR, Kardex, Intake/Output, MAR, Recent Results and Cardiac Rhythm SR-SB was reviewed with the receiving nurse. Lines:   Peripheral IV 03/26/22 Left Antecubital (Active)   Site Assessment Clean, dry, & intact 03/28/22 1200   Phlebitis Assessment 0 03/28/22 1200   Infiltration Assessment 0 03/28/22 1200   Dressing Status Dry; Intact; Old drainage 03/28/22 1200   Dressing Type Tape;Transparent 03/28/22 1200   Hub Color/Line Status Patent; Flushed;Capped 03/28/22 1200   Action Taken Open ports on tubing capped 03/28/22 1200   Alcohol Cap Used Yes 03/28/22 1200       Peripheral IV 03/26/22 Right Antecubital (Active)   Site Assessment Clean, dry, & intact 03/28/22 1200   Phlebitis Assessment 0 03/28/22 1200   Infiltration Assessment 0 03/28/22 1200   Dressing Status Clean, dry, & intact 03/28/22 1200   Dressing Type Transparent 03/28/22 1200   Hub Color/Line Status Pink;Flushed;Patent;Capped 03/28/22 1200   Action Taken Open ports on tubing capped 03/28/22 1200   Alcohol Cap Used Yes 03/28/22 1200        Opportunity for questions and clarification was provided.       Patient transported with:   Monitor  Registered Nurse

## 2022-03-28 NOTE — PROGRESS NOTES
Hospitalist Progress Note    Patient: Rodrick Velasquez MRN: [id number]  CSN: [id number]    YOB: 1960  Age: 59 y.o. Sex: female    DOA: 3/26/2022 LOS:  LOS: 2 days                Assessment/Plan     Patient Active Problem List   Diagnosis Code    Stroke ([facility] I63.9    BMI 32.0-32.9,adult Z68.32    Prediabetes R73.03    HTN (hypertension) I10        Chief complaint :  Left facial droop, slurred speech. 78-year-old female with hypertension, prediabetes, and obesity is admitted for a stroke and is status post TPA. CRITICAL CARE PLAN    Resp -  No respiratory issues,     ID - no evidence of infection. CVS - Monitor HD. Permissive HTN. Follow echo    Heme/onc - Follow H&H, plts. INR. Renal - Trend BUN, Cr. Check and replace Mg, K, phos. Endocrine -  Follow FSG    Neuro/ Pain/ Sedation -   Acute CVA -   MRI showed Small embolic acute/subacute right perisylvian MCA territory infarct. CTA head and neck with tiny calcified embolus. S/p tPA. Continue statin  Start aspirin   Neurology following    GI - regular diet. Prophylaxis - DVT: s/p tPA GI: pepcid. PT/OT    Discussed with daughter at bedside. Disposition : 1-2 days    Review of systems  General: No fevers or chills. Cardiovascular: No chest pain or pressure. No palpitations. Pulmonary: No shortness of breath. Gastrointestinal: No nausea, vomiting. Physical Exam:  General: Awake, cooperative, no acute distress    HEENT: NC, Atraumatic. PERRLA, anicteric sclerae. Lungs: CTA Bilaterally. No Wheezing/Rhonchi/Rales. Heart:  S1 S2,  No murmur, No Rubs, No Gallops  Abdomen: Soft, Non distended, Non tender.  +Bowel sounds,   Extremities: No c/c/e  Psych:   Not anxious or agitated. Neurological Exam:   Mental Status:  Alert , co-operative , memory intact . Cranial Nerves:  Intact visual fields. PERRL, EOM's full, no nystagmus, no ptosis. Facial sensation numbness on left. Left facial droop. Palate is midline. Normal sternocleidomastoid strength. Tongue is midline. Hearing is intact bilaterally. Motor:  5/5 strength in upper and lower proximal and distal muscles. Normal bulk and tone. However weak on left compare to right. Reflexes:  Deep tendon reflexes 2+/4 and symmetrical.    Sensory:  Normal and symmetric bilaterally. Gait:  Not tested. Cerebellar:  No cerebellar signs present. Vital signs/Intake and Output:  Visit Vitals  BP (!) 145/70   Pulse 64   Temp 98.5 °F (36.9 °C)   Resp 18   Ht 5' 4\" (1.626 m)   Wt 86.2 kg (190 lb)   SpO2 97%   Breastfeeding No   BMI 32.61 kg/m²     Current Shift:  03/28 0701 - 03/28 1900  In: 1000 [I.V.:1000]  Out: -   Last three shifts:  03/26 1901 - 03/28 0700  In: 327.8 [P.O.:250; I.V.:77.8]  Out: 150 [Urine:150]            Labs: Results:       Chemistry Recent Labs     03/28/22 0421 03/27/22 0526 03/26/22  1824   * 113* 132*    140 139   K 4.2 3.8 3.9    106 105   CO2 28 31 27   BUN 16 18 18   CREA 1.00 1.14 1.19   CA 8.9 8.9 9.5   AGAP 4 3 7   BUCR 16 16 15   AP 82 83 105   TP 6.8 6.8 8.2   ALB 3.1* 3.1* 4.0   GLOB 3.7 3.7 4.2*   AGRAT 0.8 0.8 1.0      CBC w/Diff Recent Labs     03/28/22 0421 03/27/22 0526 03/26/22  1825   WBC 5.6 5.9 8.2   RBC 4.33 4.44 5.37*   HGB 10.9* 11.2* 13.4   HCT 35.0 36.7 43.6    244 288   GRANS  --   --  51   LYMPH  --   --  41   EOS  --   --  1      Cardiac Enzymes No results for input(s): CPK, CKND1, GARY in the last 72 hours. No lab exists for component: CKRMB, TROIP   Coagulation No results for input(s): PTP, INR, APTT, INREXT, INREXT in the last 72 hours.     Lipid Panel Lab Results   Component Value Date/Time    Cholesterol, total 229 (H) 03/27/2022 05:26 AM    HDL Cholesterol 32 (L) 03/27/2022 05:26 AM    LDL, calculated 141.8 (H) 03/27/2022 05:26 AM    VLDL, calculated 55.2 03/27/2022 05:26 AM    Triglyceride 276 (H) 03/27/2022 05:26 AM    CHOL/HDL Ratio 7.2 (H) 03/27/2022 05:26 AM      BNP No results for input(s): BNPP in the last 72 hours.    Liver Enzymes Recent Labs     03/28/22  0421   TP 6.8   ALB 3.1*   AP 82      Thyroid Studies No results found for: T4, T3U, TSH, TSHEXT, TSHEXT     Procedures/imaging: see electronic medical records for all procedures/Xrays and details which were not copied into this note but were reviewed prior to creation of Plan

## 2022-03-28 NOTE — DIABETES MGMT
Diabetes Patient/Family Education Record    Factors That May Influence Patients Ability to Learn or Comply with Recommendations   []   Language barrier    []   Cultural needs   []   Motivation    []   Cognitive limitation    []   Physical   []   Education    []   Physiological factors   []   Hearing/vision/speaking impairment   []   Congregation beliefs    []   Financial factors   []  Other:   [x]  No factors identified at this time. Person Instructed:   [x]   Patient   []   Family   []  Other     Preference for Learning:   [x]   Verbal   [x]   Written   []  Demonstration     Level of Comprehension & Competence:    [x]  Good                                      [] Fair                                     []  Poor                             []  Needs Reinforcement   []  Teach back completed    Education Component: provided pre diabetes patient education materials   []  Medication management, including how to administer insulin (if appropriate) and potential medication interactions    []  Nutritional management - [] Obtained usual meal pattern   []   Basic carbohydrate counting  []  Plate method  []  Limit concentrated sweets and avoid sweetened beverages  []  Portion control  []    Avoid skipping meals   []  Exercise   []  Signs, symptoms, and treatment of hyperglycemia and hypoglycemia   [] Prevention, recognition and treatment of hyperglycemia and hypoglycemia   []  Importance of blood glucose monitoring  [] Blood Glucose targets   []   Provided patient with blood glucose meter  []  Has glucometer and supplies at home   []  Instruction on use of the blood glucose meter and recommended monitoring schedule   [x]  Discuss the importance of HbA1C monitoring. Patients A1c is _6__ %.  This is equivalent to average glucose of _126__ mg/dl for the past 2-3 months.   []  Sick day guidelines   []  Proper use and disposal of lancets, needles, syringes or insulin pens (if appropriate)   []  Potential long-term complications (retinopathy, kidney disease, neuropathy, foot care)   [] Information about whom to contact in case of emergency or for more information    [x]  Goal:  Patient/family will demonstrate understanding of Diabetes Self- Management Skills by: (date) __4/30_____  Plan for post-discharge education or self-management support:    [] Outpatient class schedule provided            [] Patient Declined    [] Scheduled for outpatient classes (date) _______    [] Written information provided  Verify: [] Prior to admission Diabetes medications    Does patient understand how diabetes medications work? ____________________________  Does patient have difficulty obtaining diabetes medications or testing supplies? _________________     Severiano Moors MS, RN, CDE  Glycemic Control Team  [phone]

## 2022-03-28 NOTE — PROGRESS NOTES
CM notes patient has PT/OT ordered. CM to await recommendation from therapy to assist with identifying any discharge planning needs. CM to continue to monitor and remain available. CM met with patient at bedside with daughter in the room. CM verified patient's address, primary contact (her daughter Pooja Cruz, [phone]) and her PCP whom she last saw in October. The patient is fully vaccinated and boosted with Kendell Choi, and contracted Covid in October. The patient denies using DME, denies using home O2 and will have a ride available at discharge. The patient plans to stay at her daughter's home upon discharge. Care Management Interventions  PCP Verified by CM: Yes (Via chart review: Dr. Lalo Loredo )  Palliative Care Criteria Met (RRAT>21 & CHF Dx)?: No  Mode of Transport at Discharge: Other (see comment) (Family. TBD)  Transition of Care Consult (CM Consult):  Other (Home vs home w/ HH. )  Discharge Durable Medical Equipment:  (TBD)  Health Maintenance Reviewed: Yes  Physical Therapy Consult: Yes  Occupational Therapy Consult: Yes  Speech Therapy Consult: Yes  Support Systems: Child(cristhian)  Confirm Follow Up Transport: Family  The Plan for Transition of Care is Related to the Following Treatment Goals : home vs home w/ [place]  Discharge Location  Patient Expects to be Discharged to[de-identified]  (TBD: Home vs home w/ HH. )

## 2022-03-28 NOTE — ACP (ADVANCE CARE PLANNING)
Advance Care Planning   Advance Care Planning Inpatient Note  [facility] [address] Department    Today's Date: 3/28/2022  Unit: THE [facility] INTENSIVE CARE    Received request from patient. Upon review of chart and communication with care team, patient's decision making abilities are not in question. Patient was alone in the room during visit. 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 She kept her house near [facility] and has lived in an [facility] in [place]. She has been very happy there. [place] came [place] from [place] and she will go back there for her recovery. She is putting her son first on the AMD because he is a pharmacist.  Also her daughter said - \"I'll not let you go. \"  I reviewed the form with the daughter when I was returning the original and copies. She understands that we will follow her mother's wishes. All are in good spirits. Patient is hoping to get back to work as soon as she is allowed. Patient also indicated that she wants to donate \"anything they can take. \"  But if they can't take anything she wants her body to go to science. I encouraged her to complete the Anatomical Gift paperwork ahead of time and to make sure her children know her wishes.       Interventions:  Provided education on documents for clarity and greater understanding  Assisted in the completion of documents according to patient's wishes at this time  Encouraged ongoing ACP conversation with future decision makers and loved ones    Care Preferences Communicated:  No    Outcomes/Plan:  New Advance Directive completed  Returned original document(s) to patient, as well as copies for distribution to appointed agents  Copy of Advance Directive given to staff to scan into medical record    Booker Mcintosh [facility] on 3/28/2022 at 12:06 PM

## 2022-03-28 NOTE — PROGRESS NOTES
[facility]  Pulmonary, Critical Care, and Sleep Medicine    Name: Linh Marrero MRN: [id number]   : 1960 Hospital: [facility] [place]    Date: 3/28/2022  Room: 103/[address] [place] [address] Note                                              Consult requesting physician: Dr. Lilo Tukcer  Reason for Consult: assisting with ICU care for CVA    IMPRESSION:   ·    Patient Active Problem List   Diagnosis Code    Stroke ([facility] [address] I63.9    BMI 32.0-32.9,adult Z68.32    Prediabetes R73.03    HTN (hypertension) I10   · Anemia, mild  · Code status: Full Code       RECOMMENDATIONS:   Respiratory: Stable respirations; on room air. Chest x-ray 3/26reviewed images and reportcardiac size normal; lungs clear; no focal infiltrates or consolidations; no pleural effusions. CVS: permissive hypertension; goal SBP < 180 mm Hg and DBP < 105 mm Hg per neurology. Echocardiogrampending. Telemetrysinus rhythm. ID: No active infection. Chest x-ray on admissionno pneumonia. Hematology/Oncology: Monitor hemoglobin and platelets; no active bleeding issues. Renal: Stable renal function and urine output  Creatinine normal.  Replace electrolytes as needed. GI: Patient seen by SLP nursecleared for nectar/thick liquid diet. LFTsnormal.  Endocrine: Monitor blood sugars. Neurology: S/p TPA 3/26 secondary to for right hemispheric CVA  Patient appears to be doing well from neurological standpoint. Repeat CT head yesterday eveningsmall right hemispheric stroke; no hemorrhage. Called and discussed with Dr. Cristi Seo to start aspirin 81 mg daily. Continue statin. Further blood pressure recommendations awaited from neurology review today. Skin/Wound: ICU nursing care. Electrolytes: Replace electrolytes per ICU electrolyte replacement protocol  IVF: LR 75 mL/h. Nutrition: Oral diet [currently easy to chew] as tolerated; SLP team follow-up. Prophylaxis: DVT Prophylaxis: SCD. GI Prophylaxis: Pepcid. Lines/Tubes: PIV  PT/OT eval and treat. Stable to be transferred to telemetry floor from ICU perspective; await neurology follow-up today. Quality Care: PPI, DVT prophylaxis, HOB elevated, Infection control all reviewed and addressed. Discussed with patient and updated management plans. Discussed with ICU RN. High complexity decision making was performed during the evaluation of this patient. Subjective/History of Present Illness:   Patient is a 59 y.o. female with PMHx significant for HTN, obesity, prediabetic presented to [facility] ER after noticed LT side facial and arm weakness with slurred speech, minimal LT side blurred vision while brushing her tooth. In ER, code S called. She received t-PA per neurology evaluation after CT head. She is admitted to ICU post-t-PA and seen at bedside in ICU rm # [id number].       3/28/2022   Chart reviewed; discussed during signout. Patient remains in ICU #[id number]. Improved weakness left arm; left facial droop persist.  No chest pain or palpitations; telemetrysinus rhythm. Blood pressure hypertensive sidepost stroke protocol. No chest pain or palpitations. No cough or shortness of breath. 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 No smoking or alcohol use. Covid vaccinationbooster September 2022 with Kannan Ramos. Covid infectionOctober 2021, January 2022both times mild infection; did not get monoclonal antibody as well. Review of Systems:  No fever or chills  No chest pain or palpitations  No vomiting or diarrhea  No cough or shortness of breath  No difficulty in swallowing      No Known Allergies     Past Medical History:   Diagnosis Date    BMI 32.0-32.9,adult 3/26/2022    HTN (hypertension) 3/26/2022    Prediabetes 3/26/2022      History reviewed. No pertinent surgical history.      Social History     Tobacco Use    Smoking status: Not on file    Smokeless tobacco: Not on file   Substance Use Topics    Alcohol use: Not on file      Family History   Problem Relation Age of Onset    Stroke Mother       Prior to Admission medications    Not on File     Current Facility-Administered Medications   Medication Dose Route Frequency    insulin lispro (HUMALOG) injection   SubCUTAneous AC&HS    sodium chloride (NS) flush 5-40 mL  5-40 mL IntraVENous Q8H    atorvastatin (LIPITOR) tablet 80 mg  80 mg Oral QHS    sodium chloride (NS) flush 5-40 mL  5-40 mL IntraVENous Q8H    famotidine (PF) (PEPCID) 20 mg in 0.9% sodium chloride 10 mL injection  20 mg IntraVENous BID    lactated Ringers infusion  75 mL/hr IntraVENous CONTINUOUS         Objective:   Vital Signs:    Visit Vitals  BP (!) 108/50   Pulse (!) 59   Temp 98.3 °F (36.8 °C)   Resp 18   Ht 5' 4\" (1.626 m)   Wt 86.2 kg (190 lb)   SpO2 95%   Breastfeeding No   BMI 32.61 kg/m²       O2 Device: None (Room air)       Temp (24hrs), Av.1 °F (36.7 °C), Min:97.3 °F (36.3 °C), Max:98.4 °F (36.9 °C)       Intake/Output:   Last shift:      No intake/output data recorded. Last 3 shifts:  1901 -  0700  In: 327.8 [P.O.:250;  I.V.:77.8]  Out: 150 [Urine:150]      Intake/Output Summary (Last 24 hours) at 3/28/2022 1019  Last data filed at 3/27/2022 2000  Gross per 24 hour   Intake 250 ml   Output    Net 250 ml       Last 3 Recorded Weights in this Encounter    22 1840 22 1922 22 0829   Weight: 87.1 kg (192 lb) 86.2 kg (190 lb) 86.2 kg (190 lb)       Physical Exam:      Physical Exam:   Comfortable; on room air; acyanotic  HEENT: pupils not dilated, reactive, no scleral jaundice  Neck: No adenopathy or thyroid swelling  CVS: S1S2 no murmurs; JVD not elevated; telemetrysinus rhythm  RS: Good air entry bilaterally, normal respirations, no wheezes or crackles  Abd: soft, non tender, no hepatosplenomegaly, no abd distension, no guarding or rigidity, bowel sounds heard  Neuro: Good strength in left upper extremity; normal strength in other extremities; mild left facial droop present  Extrm: no leg edema or swelling or clubbing  Lymphatic: no cervical or supraclavicular adenopathy  Psych: Cooperative      Data:       Recent Results (from the past 24 hour(s))   GLUCOSE, POC    Collection Time: 03/27/22 12:49 PM   Result Value Ref Range    Glucose (POC) 101 70 - 110 mg/dL   DUPLEX CAROTID BILATERAL    Collection Time: 03/27/22  1:20 PM   Result Value Ref Range    Right CCA prox sys 102.5 cm/s    Right CCA prox lazo 17.7 cm/s    Right cca dist sys 56.9 cm/s    Right CCA dist lazo 15.1 cm/s    Right eca sys 112.9 cm/s    RIGHT EXTERNAL CAROTID ARTERY D 11.20 cm/s    Right ICA prox sys 115.4 cm/s    Right ICA prox lazo 32.0 cm/s    Right ICA mid sys 119.8 cm/s    Right ICA mid lazo 43.0 cm/s    Right ICA dist sys 78.1 cm/s    Right ICA dist lazo 27.7 cm/s    Right subclavian prox PSV 78.1 cm/s    Right subclavian prox EDV 0.0 cm/s    Right vertebral sys 57.2 cm/s    RIGHT VERTEBRAL ARTERY D 19.20 cm/s    Right ICA/CCA sys 2.1     Left CCA prox sys 105.1 cm/s    Left CCA prox lazo 15.5 cm/s    Left CCA dist sys 62.1 cm/s    Left CCA dist lazo 13.0 cm/s    Left ECA sys 76.8 cm/s    LEFT EXTERNAL CAROTID ARTERY D 6.90 cm/s    Left ICA prox sys 98.9 cm/s    Left ICA prox lazo 30.2 cm/s    Left ICA mid sys 113.2 cm/s    Left ICA mid lazo 40.0 cm/s    Left ICA dist sys 98.1 cm/s    Left ICA dist lazo 29.3 cm/s    Left subclavian prox PSV 81.3 cm/s    Left subclavian prox EDV 0.0 cm/s    Left vertebral sys 34.3 cm/s    LEFT VERTEBRAL ARTERY D 10.60 cm/s    Left ICA/CCA sys 1.80    GLUCOSE, POC    Collection Time: 03/27/22  6:35 PM   Result Value Ref Range    Glucose (POC) 90 70 - 110 mg/dL   GLUCOSE, POC    Collection Time: 03/27/22  9:04 PM   Result Value Ref Range    Glucose (POC) 83 70 - 548 mg/dL   METABOLIC PANEL, COMPREHENSIVE    Collection Time: 03/28/22  4:21 AM   Result Value Ref Range    Sodium 139 136 - 145 mmol/L    Potassium 4.2 3.5 - 5.5 mmol/L    Chloride 107 100 - 111 mmol/L    CO2 28 21 - 32 mmol/L    Anion gap 4 3.0 - 18 mmol/L    Glucose 116 (H) 74 - 99 mg/dL    BUN 16 7.0 - 18 MG/DL    Creatinine 1.00 0.6 - 1.3 MG/DL    BUN/Creatinine ratio 16 12 - 20      GFR est AA >60 >60 ml/min/1.73m2    GFR est non-AA 56 (L) >60 ml/min/1.73m2    Calcium 8.9 8.5 - 10.1 MG/DL    Bilirubin, total 0.3 0.2 - 1.0 MG/DL    ALT (SGPT) 20 13 - 56 U/L    AST (SGOT) 14 10 - 38 U/L    Alk. phosphatase 82 45 - 117 U/L    Protein, total 6.8 6.4 - 8.2 g/dL    Albumin 3.1 (L) 3.4 - 5.0 g/dL    Globulin 3.7 2.0 - 4.0 g/dL    A-G Ratio 0.8 0.8 - 1.7     CBC W/O DIFF    Collection Time: 03/28/22  4:21 AM   Result Value Ref Range    WBC 5.6 4.6 - 13.2 K/uL    RBC 4.33 4.20 - 5.30 M/uL    HGB 10.9 (L) 12.0 - 16.0 g/dL    HCT 35.0 35.0 - 45.0 %    MCV 80.8 78.0 - 100.0 FL    MCH 25.2 24.0 - 34.0 PG    MCHC 31.1 31.0 - 37.0 g/dL    RDW 14.3 11.6 - 14.5 %    PLATELET 686 825 - 741 K/uL    MPV 9.9 9.2 - 11.8 FL    NRBC 0.0 0  WBC    ABSOLUTE NRBC 0.00 0.00 - 0.01 K/uL   GLUCOSE, POC    Collection Time: 03/28/22  8:36 AM   Result Value Ref Range    Glucose (POC) 106 70 - 110 mg/dL         Chemistry Recent Labs     03/28/22  0421 03/27/22  0526 03/26/22  1824   * 113* 132*    140 139   K 4.2 3.8 3.9    106 105   CO2 28 31 27   BUN 16 18 18   CREA 1.00 1.14 1.19   CA 8.9 8.9 9.5   AGAP 4 3 7   BUCR 16 16 15   AP 82 83 105   TP 6.8 6.8 8.2   ALB 3.1* 3.1* 4.0   GLOB 3.7 3.7 4.2*   AGRAT 0.8 0.8 1.0        Lactic Acid No results found for: LAC  No results for input(s): LAC in the last 72 hours. Liver Enzymes Protein, total   Date Value Ref Range Status   03/28/2022 6.8 6.4 - 8.2 g/dL Final     Albumin   Date Value Ref Range Status   03/28/2022 3.1 (L) 3.4 - 5.0 g/dL Final     Globulin   Date Value Ref Range Status   03/28/2022 3.7 2.0 - 4.0 g/dL Final     A-G Ratio   Date Value Ref Range Status   03/28/2022 0.8 0.8 - 1.7   Final     Alk.  phosphatase   Date Value Ref Range Status   03/28/2022 82 45 - 117 U/L Final     Recent Labs     03/28/22  0421 03/27/22  0526 03/26/22  1824   TP 6.8 6.8 8.2   ALB 3.1* 3.1* 4.0   GLOB 3.7 3.7 4.2*   AGRAT 0.8 0.8 1.0   AP 82 83 105        CBC w/Diff Recent Labs     03/28/22  0421 03/27/22  0526 03/26/22  1825   WBC 5.6 5.9 8.2   RBC 4.33 4.44 5.37*   HGB 10.9* 11.2* 13.4   HCT 35.0 36.7 43.6    244 288   GRANS  --   --  51   LYMPH  --   --  41   EOS  --   --  1          Culture data during this hospitalization. All Micro Results     None           Images report reviewed by me:  CT head 3/27 (Most Recent)  Results from [place] encounter on 03/26/22    CT HEAD WO CONT    Narrative  EXAM: CT HEAD WO CONT    CLINICAL INDICATION/HISTORY: stroke  -Additional: None    COMPARISON: MRI brain and CT head from same day    TECHNIQUE: Axial CT imaging of the head was performed without intravenous  contrast.  One or more dose reduction techniques were used on this CT: automated exposure  control, adjustment of the mAs and/or kVp according to patient size, and  iterative reconstruction techniques. The specific techniques used on this CT  exam have been documented in the patient's electronic medical record. Digital  Imaging and Communications in Medicine (DICOM) format image data are available  to nonaffiliated external healthcare facilities or entities on a secure, media  free, reciprocally searchable basis with patient authorization for at least a  12-month period after this study. _______________    FINDINGS:    BRAIN:  > Brain volume: Age appropriate.  > White matter: Little or no white matter disease. > Infarcts, encephalomalacia: Small right posterior frontal deep point matter  subtle lucency, sight of prior same day's infarct on MRI. Stable anterior left  frontal white matter and insular chronic lucency. > Parenchymal mass: None.  > Parenchymal hemorrhage: None.  > Midline shift: None.  > Miscellaneous: None. EXTRA-AXIAL SPACES: Unremarkable.   No fluid collections. CALVARIUM: Intact. SINUSES, MASTOIDS: Clear. OTHER EXTRACRANIAL: Unremarkable.    _______________    Impression  1. No acute intra-axial hemorrhage. 2. Small developing lucency in the posterior right frontal lobe deep white  matter, expected maturational changes of known infarct on preceding same day's  MRI. MRI brain 3/27  IMPRESSION  Small embolic acute/subacute right perisylvian MCA territory infarct.        CXR reviewed by me:  XR 3/26 (Most Recent). Results from [place] encounter on 03/26/22    XR CHEST PORT    Narrative  ---------------------------------------------------------------------------  <<<<<<<<<           [facility]           >>>>>>>>>  ---------------------------------------------------------------------------    CLINICAL HISTORY:  Cerebrovascular accident. COMPARISON EXAMINATIONS:  None. ---  SINGLE FRONTAL VIEW OF THE CHEST  ---    The lungs and pleural spaces are clear. The mediastinum is unremarkable in  appearance. No significant osseous abnormalities are identified.      --------------    Impression  --------------    No evidence for active cardiopulmonary disease. Please note: Voice-recognition software may have been used to generate this report, which may have resulted in some phonetic-based errors in grammar and contents. Even though attempts were made to correct all the mistakes, some may have been missed, and remained in the body of the document.       Familia Gibbons MD  3/28/2022

## 2022-03-28 NOTE — PROGRESS NOTES
Problem: Mobility Impaired (Adult and Pediatric)  Goal: *Acute Goals and Plan of Care (Insert Text)  Outcome: Progressing Towards Goal  Note:   physical Therapy EVALUATION    Patient: Linh Marrero (62 y.o. female)  Date: 3/28/2022  Primary Diagnosis: Stroke [facility]) [I63.9]  Precautions:  Fall    ASSESSMENT :  Based on the objective data described below, the patient presents with equal strength bilaterally, fair standing balance, CGA for ambulation in hallway, no overt LOB or unsteadiness during ambulation, L UE weakness and decreased  strength. Pt with mild sway with tandem and eyes closed positions. Pt would benefit from continued skilled PT services to progress functional mobility. Patient will benefit from skilled intervention to address the above impairments. Patients rehabilitation potential is considered to be Good  Factors which may influence rehabilitation potential include:   []         None noted  []         Mental ability/status  []         Medical condition  []         Home/family situation and support systems  []         Safety awareness  []         Pain tolerance/management  []         Other:      PLAN :  Recommendations and Planned Interventions:  [x]           Bed Mobility Training             []    Neuromuscular Re-Education  [x]           Transfer Training                   []    Orthotic/Prosthetic Training  [x]           Gait Training                          [x]    Modalities  [x]           Therapeutic Exercises          [x]    Edema Management/Control  [x]           Therapeutic Activities            [x]    Patient and Family Training/Education  []           Other (comment):    Frequency/Duration: Patient will be followed by physical therapy 1-2 times per day to address goals. Discharge Recommendations: Outpatient versus None  Further Equipment Recommendations for Discharge: N/A     SUBJECTIVE:   Patient stated I am doing ok.     OBJECTIVE DATA SUMMARY:     Past Medical History: Diagnosis Date    BMI 32.0-32.9,adult 3/26/2022    HTN (hypertension) 3/26/2022    Prediabetes 3/26/2022   History reviewed. No pertinent surgical history. Barriers to Learning/Limitations: None  Compensate with: Visual Cues and Verbal Cues  Prior Level of Function/Home Situation: Independent ambulation without AD  Home Situation  Home Environment: Private residence  One/Two Story Residence: One story  Living Alone: Yes (will live with daughter until stronger)  Support Systems: Child(cristhian)  Patient Expects to be Discharged to[de-identified] Home with family assistance  Tub or Shower Type: Tub/Shower combination  Critical Behavior:  Neurologic State: Alert  Orientation Level: Oriented X4  Cognition: Appropriate decision making; Appropriate for age attention/concentration; Appropriate safety awareness; Follows commands  Safety/Judgement: Fall prevention  Psychosocial  Patient Behaviors: Calm; Cooperative  Family  Behaviors: Calm; Cooperative  Purposeful Interaction: Yes  Pt Identified Daily Priority: Clinical issues (comment)  Caritas Process: Establish trust;Teaching/learning; Attend basic human needs  Caring Interventions: Reassure  Reassure: Therapeutic listening  Therapeutic Modalities: Humor;Guided Imagery channel ([facility] only)  Skin Condition/Temp: Dry;Warm  Family  Behaviors: Calm; Cooperative  Skin Integrity: Intact  Skin Integumentary  Skin Color: Appropriate for ethnicity  Skin Condition/Temp: Dry;Warm  Skin Integrity: Intact   Strength:    Strength: Generally decreased, functional  Tone & Sensation:   Tone: Normal  Sensation: Intact  Range Of Motion:  AROM: Within functional limits  Functional Mobility:  Bed Mobility:   Supine to Sit: Stand-by assistance  Sit to Supine: Stand-by assistance   Transfers:  Sit to Stand: Contact guard assistance  Stand to Sit: Contact guard assistance  Balance:   Sitting: Intact  Standing: Intact; Without support  Ambulation/Gait Training:  Distance (ft): 150 Feet (ft)  Assistive Device: Gait belt  Ambulation - Level of Assistance: Contact guard assistance   Gait Description (WDL): Exceptions to WDL  Gait Abnormalities: Decreased step clearance  Base of Support: Narrowed   Speed/Olena: Slow  Step Length: Right shortened;Left shortened  Pain:  Pain Scale 1: Numeric (0 - 10)  Pain Intensity 1: 0  Activity Tolerance:   Good  Please refer to the flowsheet for vital signs taken during this treatment. After treatment:   []         Patient left in no apparent distress sitting up in chair  [x]         Patient left in no apparent distress in bed  [x]         Call bell left within reach  [x]         Nursing notified  []         Caregiver present  []         Bed alarm activated    COMMUNICATION/EDUCATION:   [x]         Fall prevention education was provided and the patient/caregiver indicated understanding. [x]         Patient/family have participated as able in goal setting and plan of care. [x]         Patient/family agree to work toward stated goals and plan of care. []         Patient understands intent and goals of therapy, but is neutral about his/her participation. []         Patient is unable to participate in goal setting and plan of care.     Thank you for this referral.  Holly Carreno   Time Calculation: 23 mins   Eval Complexity: History: MEDIUM  Complexity : 1-2 comorbidities / personal factors will impact the outcome/ POC Exam:LOW Complexity : 1-2 Standardized tests and measures addressing body structure, function, activity limitation and / or participation in recreation  Presentation: LOW Complexity : Stable, uncomplicated  Clinical Decision Making:Low Complexity    Overall Complexity:LOW

## 2022-03-28 NOTE — PROGRESS NOTES
OCCUPATIONAL THERAPY EVALUATION    Patient: Linh Marrero (69 y.o. 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     ASSESSMENT :  Based on the objective data described below, the patient presents with decreased independence in self care tasks, weakness of the left upper extremity impacting self care and work tasks. Pt seen with Pt for seconds set of skilled hands. Pt reports no pain, is able to perform bed mobility and walking with PT with minimal LOB. Pt is able to perform bathroom tasks, dressing LOB with CGA, able to don/doff socks with no LOB. Pt does have weakness of the Left UE through shoulder, wrist, and hand. 3+/5 muscle grade that is preventing overhead reach and would impact pt return to work as she has difficulty opening food containers. Pt educated on HEP to perform for upper body strengthening, would recommend seeing outpatient OT to work on UE strength. Pt left with daughter in room, all needs in reach. Education: Educated on role of OT in acute setting    Patient will benefit from skilled intervention to address the above impairments.   Patient's rehabilitation potential is considered to be Excellent  Factors which may influence rehabilitation potential include:   []             None noted  []             Mental ability/status  [x]             Medical condition  []             Home/family situation and support systems  []             Safety awareness  [x]             Pain tolerance/management  []             Other:      PLAN :  Recommendations and Planned Interventions:   [x]               Self Care Training                  [x]      Therapeutic Activities  [x]               Functional Mobility Training   []      Cognitive Retraining  [x]               Therapeutic Exercises           [x]      Endurance Activities  [x]               Balance Training [x]      Neuromuscular Re-Education  []               Visual/Perceptual Training     [x]      Home Safety Training  [x]               Patient Education                   [x]      Family Training/Education  []               Other (comment):    Frequency/Duration: Patient will be followed by occupational therapy daily to address goals. Discharge Recommendations: Outpatient OT   Further Equipment Recommendations for Discharge: TBD     SUBJECTIVE:   Patient stated I feel pretty good.     OBJECTIVE DATA SUMMARY:     Past Medical History:   Diagnosis Date    BMI 32.0-32.9,adult 3/26/2022    HTN (hypertension) 3/26/2022    Prediabetes 3/26/2022   History reviewed. No pertinent surgical history. Barriers to Learning/Limitations: None  Compensate with: visual, verbal, tactile, kinesthetic cues/model    Home Situation:   Home Situation  Home Environment: Private residence  One/Two Story Residence: One story  Living Alone: Yes (will live with daughter until stronger)  Support Systems: Child(cristhian)  Patient Expects to be Discharged to[de-identified] Home with family assistance  Tub or Shower Type: Tub/Shower combination  [x]  Right hand dominant   []  Left hand dominant    Cognitive/Behavioral Status:  Neurologic State: Alert  Orientation Level: Oriented X4  Cognition: Appropriate decision making; Appropriate for age attention/concentration; Appropriate safety awareness; Follows commands  Safety/Judgement: Fall prevention    Skin: intact  Edema: none noted    Vision/Perceptual:    Tracking: Able to track stimulus in all quadrants w/o difficulty              Corrective Lenses: Glasses    Coordination: BUE  Coordination: Generally decreased, functional  Fine Motor Skills-Upper: Left Intact; Right Intact    Gross Motor Skills-Upper: Left Impaired;Right Intact    Balance:  Sitting: Intact  Standing: Intact    Strength: BUE  Strength: Generally decreased, functional       Tone & Sensation: BUE  Tone: Normal  Sensation: Intact      Range of Motion: BUE  AROM: Generally decreased, functional      Functional Mobility and Transfers for ADLs:  Bed Mobility:     Supine to Sit: Stand-by assistance  Sit to Supine: Stand-by assistance     Transfers:  Sit to Stand: Contact guard assistance     Toilet Transfer : Contact guard assistance         Bathroom Mobility: Contact guard assistance    ADL Assessment:   Feeding: Setup    Oral Facial Hygiene/Grooming: Contact guard assistance    Bathing: Contact guard assistance    Upper Body Dressing: Supervision    Lower Body Dressing: Contact guard assistance    Toileting: Contact guard assistance                ADL Intervention:       Grooming  Grooming Assistance: Contact guard assistance  Position Performed: Standing  Washing Hands: Contact guard assistance         Lower Body Dressing Assistance  Dressing Assistance: Contact guard assistance  Socks: Contact guard assistance  Leg Crossed Method Used: Yes  Position Performed: Seated in chair         Cognitive Retraining  Safety/Judgement: Fall prevention      Pain:  Pain level pre-treatment: 0/10   Pain level post-treatment: 0/10   Pain Intervention(s): Medication provided by Nursing (see MAR); Rest, Ice, Repositioning   Response to intervention: Nurse notified, See doc flow sheet    Activity Tolerance:   Good, pt able to perform all tasks with minimal rest breaks   Please refer to the flowsheet for vital signs taken during this treatment. After treatment:   [] Patient left in no apparent distress sitting up in chair  [x] Patient left in no apparent distress in bed  [x] Call bell left within reach  [x] Nursing notified  [] Caregiver present  [] Bed alarm activated    COMMUNICATION/EDUCATION:   [x] Role of Occupational Therapy in the acute care setting  [x] Home safety education was provided and the patient/caregiver indicated understanding. [x] Patient/family have participated as able in goal setting and plan of care.   [x] Patient/family agree to work toward stated goals and plan of care. [] Patient understands intent and goals of therapy, but is neutral about his/her participation. [] Patient is unable to participate in goal setting and plan of care. Thank you for this referral.  Emily SLOAN, OTR/L   Time Calculation: 43 mins    Eval Complexity: History: MEDIUM Complexity : Expanded review of history including physical, cognitive and psychosocial  history ; Examination: MEDIUM Complexity : 3-5 performance deficits relating to physical, cognitive , or psychosocial skils that result in activity limitations and / or participation restrictions; Decision Making:MEDIUM Complexity : Patient may present with comorbidities that affect occupational performnce.  Miniml to moderate modification of tasks or assistance (eg, physical or verbal ) with assesment(s) is necessary to enable patient to complete evaluation

## 2022-03-28 NOTE — PROGRESS NOTES
1550 TRANSFER - IN REPORT:    Verbal report received from [address] (name) on Janina Suresh  being received from ICU (unit) for routine progression of care      Report consisted of patients Situation, Background, Assessment and   Recommendations(SBAR). Information from the following report(s) SBAR, Kardex, [facility] and Cardiac Rhythm SR/SB was reviewed with the receiving nurse. Opportunity for questions and clarification was provided. Assessment completed upon patients arrival to unit and care assumed.

## 2022-03-28 NOTE — PROGRESS NOTES
NEUROLOGY PROGRESS NOTE        Patient: Amna Gupta        Sex: female          DOA: 3/26/2022  YOB: 1960      Age:  59 y.o.         LOS: 2 days     Identification:  59 y.o. RH female with history of HTN, who presented with Left hand weakness, slurred speech, s/po tPA. SUBJECTIVE:   The patient is doing stable. She continues to have several slurred speech and left arm weakness. Hemodynamic findings are stable. Stroke Work-up:  Brain MRI: Small embolic acute/subacute right perisylvian MCA territory infarct. Repeat CT Head: 1. No acute intra-axial hemorrhage. 2. Small developing lucency in the posterior right frontal lobe deep white matter, expected maturational changes of known infarct on preceding same day's MRI. CTA NECK: HUBER:  Approximately 50% bulb stenosis. LICA:  0% stenosis. RVA:  Dominant, widely patent. LVA:  Origin stenosis, partially obscured, suspected to be high-grade. CTA BRAIN: Negative. Echocardiogram:   Lipid panel:   Lab Results   Component Value Date/Time    Cholesterol, total 229 (H) 03/27/2022 05:26 AM    HDL Cholesterol 32 (L) 03/27/2022 05:26 AM    LDL, calculated 141.8 (H) 03/27/2022 05:26 AM    VLDL, calculated 55.2 03/27/2022 05:26 AM    Triglyceride 276 (H) 03/27/2022 05:26 AM    CHOL/HDL Ratio 7.2 (H) 03/27/2022 05:26 AM     HbA1c:   Lab Results   Component Value Date/Time    Hemoglobin A1c 6.0 (H) 03/27/2022 05:26 AM     REVIEW OF SYSTEMS: Denies chest pain, abdominal pain, nausea or vomiting. No fever or chills. OBJECTIVE:      Visit Vitals  BP (!) 148/84   Pulse 67   Temp 98.3 °F (36.8 °C)   Resp 22   Ht 5' 4\" (1.626 m)   Wt 86.2 kg (190 lb)   SpO2 96%   Breastfeeding No   BMI 32.61 kg/m²     Physical Exam:  GEN: Alert, NAD  EYES: conjunctiva normal, lids with out lesions  HEENT: MMM. HEART: RRR +S1 +S2  LUNGS: CTA B/L no rales or rhonchi. ABDOMEN: Soft, non-tender.   EXTREMITIES: No edema cyanosis  SKIN: no rashes or skin breakdown, no nodules  NEURO: Alert, oriented x3. Speech is slightly dysarthric. Cranials: Slight left facial droop with smile. EOMI, VFF. Tongue is midline. Motor: Subtle pronator drift on the left arm only. Sensory: Intact to crude touch on all four. Coordination: Intact with no dysmetria during FNF.      Current Facility-Administered Medications   Medication Dose Route Frequency    aspirin chewable tablet 81 mg  81 mg Oral DAILY    insulin lispro (HUMALOG) injection   SubCUTAneous AC&HS    sodium chloride (NS) flush 5-40 mL  5-40 mL IntraVENous Q8H    sodium chloride (NS) flush 5-40 mL  5-40 mL IntraVENous PRN    ondansetron (ZOFRAN) injection 4 mg  4 mg IntraVENous Q6H PRN    atorvastatin (LIPITOR) tablet 80 mg  80 mg Oral QHS    sodium chloride (NS) flush 5-40 mL  5-40 mL IntraVENous Q8H    sodium chloride (NS) flush 5-40 mL  5-40 mL IntraVENous PRN    acetaminophen (TYLENOL) tablet 650 mg  650 mg Oral Q6H PRN    Or    acetaminophen (TYLENOL) suppository 650 mg  650 mg Rectal Q6H PRN    polyethylene glycol (MIRALAX) packet 17 g  17 g Oral DAILY PRN    famotidine (PF) (PEPCID) 20 mg in 0.9% sodium chloride 10 mL injection  20 mg IntraVENous BID    glucose chewable tablet 16 g  16 g Oral PRN    glucagon (GLUCAGEN) injection 1 mg  1 mg IntraMUSCular PRN    dextrose 10% infusion 0-250 mL  0-250 mL IntraVENous PRN    lactated Ringers infusion  75 mL/hr IntraVENous CONTINUOUS       Laboratory  Recent Results (from the past 24 hour(s))   GLUCOSE, POC    Collection Time: 03/27/22  6:35 PM   Result Value Ref Range    Glucose (POC) 90 70 - 110 mg/dL   GLUCOSE, POC    Collection Time: 03/27/22  9:04 PM   Result Value Ref Range    Glucose (POC) 83 70 - 430 mg/dL   METABOLIC PANEL, COMPREHENSIVE    Collection Time: 03/28/22  4:21 AM   Result Value Ref Range    Sodium 139 136 - 145 mmol/L    Potassium 4.2 3.5 - 5.5 mmol/L    Chloride 107 100 - 111 mmol/L    CO2 28 21 - 32 mmol/L    Anion gap 4 3.0 - 18 mmol/L    Glucose 116 (H) 74 - 99 mg/dL    BUN 16 7.0 - 18 MG/DL    Creatinine 1.00 0.6 - 1.3 MG/DL    BUN/Creatinine ratio 16 12 - 20      GFR est AA >60 >60 ml/min/1.73m2    GFR est non-AA 56 (L) >60 ml/min/1.73m2    Calcium 8.9 8.5 - 10.1 MG/DL    Bilirubin, total 0.3 0.2 - 1.0 MG/DL    ALT (SGPT) 20 13 - 56 U/L    AST (SGOT) 14 10 - 38 U/L    Alk.  phosphatase 82 45 - 117 U/L    Protein, total 6.8 6.4 - 8.2 g/dL    Albumin 3.1 (L) 3.4 - 5.0 g/dL    Globulin 3.7 2.0 - 4.0 g/dL    A-G Ratio 0.8 0.8 - 1.7     CBC W/O DIFF    Collection Time: 03/28/22  4:21 AM   Result Value Ref Range    WBC 5.6 4.6 - 13.2 K/uL    RBC 4.33 4.20 - 5.30 M/uL    HGB 10.9 (L) 12.0 - 16.0 g/dL    HCT 35.0 35.0 - 45.0 %    MCV 80.8 78.0 - 100.0 FL    MCH 25.2 24.0 - 34.0 PG    MCHC 31.1 31.0 - 37.0 g/dL    RDW 14.3 11.6 - 14.5 %    PLATELET 781 484 - 444 K/uL    MPV 9.9 9.2 - 11.8 FL    NRBC 0.0 0  WBC    ABSOLUTE NRBC 0.00 0.00 - 0.01 K/uL   GLUCOSE, POC    Collection Time: 03/28/22  8:36 AM   Result Value Ref Range    Glucose (POC) 106 70 - 110 mg/dL   ECHO ADULT COMPLETE    Collection Time: 03/28/22 10:09 AM   Result Value Ref Range    IVSd 1.1 (A) 0.6 - 0.9 cm    LVIDd 4.5 3.9 - 5.3 cm    LVIDs 3.2 cm    LVOT Diameter 2.0 cm    LVPWd 1.0 (A) 0.6 - 0.9 cm    Global Longitudinal Strain -21.3 %    Global Longitudinal Strain -17.9 %    Global Longitudinal Strain -16.9 %    Global Longitudinal Strain -18.7 %    EF BP 62 55 - 100 %    LV Ejection Fraction A2C 61 %    LV Ejection Fraction A4C 63 %    LV EDV A2C 72 mL    LV EDV A4C 93 mL    LV EDV BP 83 56 - 104 mL    LV ESV A2C 28 mL    LV ESV A4C 35 mL    LV ESV BP 31 19 - 49 mL    LVOT Peak Gradient 3 mmHg    LVOT Mean Gradient 2 mmHg    LVOT SV 71.6 ml    LVOT Peak Velocity 0.9 m/s    LVOT VTI 22.8 cm    RVIDd 3.1 cm    RVSP 36 mmHg    RV Free Wall Peak S' 14 cm/s    LA Diameter 3.6 cm    LA Volume A/L 30 mL    LA Volume 2C 17 (A) 22 - 52 mL    LA Volume 4C 33 22 - 52 mL    LA Volume BP 27 22 - 52 mL Est. RA Pressure 8 mmHg    AV Area by Peak Velocity 1.8 cm2    AV Area by VTI 1.8 cm2    AV Peak Gradient 9 mmHg    AV Mean Gradient 5 mmHg    AV Peak Velocity 1.5 m/s    AV Mean Velocity 1.1 m/s    AV VTI 38.1 cm    MV A Velocity 1.09 m/s    MV E Wave Deceleration Time 269.7 ms    MV E Velocity 0.83 m/s    LV E' Lateral Velocity 7 cm/s    LV E' Septal Velocity 7 cm/s    Pulmonary Artery EDP 5 mmHg    [place] Max Velocity 1.2 m/s    TAPSE 2.7 (A) 1.5 - 2.0 cm    TR Peak Gradient 28 mmHg    TR Max Velocity 2.65 m/s    Aortic Root 2.9 cm    Fractional Shortening 2D 29 28 - 44 %    LV ESV Index BP 16 mL/m2    LV EDV Index BP 43 mL/m2    LV ESV Index A4C 18 mL/m2    LV EDV Index A4C 49 mL/m2    LV ESV Index A2C 15 mL/m2    LV EDV Index A2C 38 mL/m2    LVIDd Index 2.36 cm/m2    LVIDs Index 1.68 cm/m2    LV RWT Ratio 0.44     LV Mass 2D 164.0 (A) 67 - 162 g    LV Mass 2D Index 85.9 43 - 95 g/m2    MV E/A 0.76     E/E' Ratio (Averaged) 11.86     E/E' Lateral 11.86     E/E' Septal 11.86     LA Volume Index BP 14 (A) 16 - 34 ml/m2    LA Volume Index A/L 16 16 - 34 mL/m2    LVOT Stroke Volume Index 37.5 mL/m2    LVOT Area 3.1 cm2    LA Volume Index 2C 9 (A) 16 - 34 mL/m2    LA Volume Index 4C 17 16 - 34 mL/m2    LA Size Index 1.88 cm/m2    LA/AO Root Ratio 1.24     Ao Root Index 1.52 cm/m2    AV Velocity Ratio 0.60     LVOT:AV VTI Index 0.60     KAY/BSA VTI 0.9 cm2/m2    KAY/BSA Peak Velocity 0.9 cm2/m2   DUPLEX CAROTID BILATERAL    Collection Time: 03/28/22 10:39 AM   Result Value Ref Range    Right CCA prox sys 102.5 cm/s    Right CCA prox lazo 17.7 cm/s    Right cca dist sys 56.9 cm/s    Right CCA dist lazo 15.1 cm/s    Right eca sys 112.9 cm/s    RIGHT EXTERNAL CAROTID ARTERY D 11.20 cm/s    Right ICA prox sys 115.4 cm/s    Right ICA prox lazo 32.0 cm/s    Right ICA mid sys 119.8 cm/s    Right ICA mid lazo 43.0 cm/s    Right ICA dist sys 78.1 cm/s    Right ICA dist lazo 27.7 cm/s    Right subclavian prox PSV 78.1 cm/s Right subclavian prox EDV 0.0 cm/s    Right vertebral sys 57.2 cm/s    RIGHT VERTEBRAL ARTERY D 19.20 cm/s    Right ICA/CCA sys 2.1     Left CCA prox sys 105.1 cm/s    Left CCA prox lazo 15.5 cm/s    Left CCA dist sys 62.1 cm/s    Left CCA dist lazo 13.0 cm/s    Left ECA sys 76.8 cm/s    LEFT EXTERNAL CAROTID ARTERY D 6.90 cm/s    Left ICA prox sys 98.9 cm/s    Left ICA prox lazo 30.2 cm/s    Left ICA mid sys 113.2 cm/s    Left ICA mid lazo 40.0 cm/s    Left ICA dist sys 98.1 cm/s    Left ICA dist lazo 29.3 cm/s    Left subclavian prox PSV 81.3 cm/s    Left subclavian prox EDV 0.0 cm/s    Left vertebral sys 34.3 cm/s    LEFT VERTEBRAL ARTERY D 10.60 cm/s    Left ICA/CCA sys 1.80    GLUCOSE, POC    Collection Time: 03/28/22 11:31 AM   Result Value Ref Range    Glucose (POC) 127 (H) 70 - 110 mg/dL       Radiology:  MRI BRAIN WO CONT    Result Date: 3/27/2022  EXAM: MRI of the brain without intravenous contrast. INDICATION:  \"stroke. \" ADDITIONAL HISTORY:  Facial droop. COMPARISON:  No prior MRI is available for direct comparison. CORRELATION (related prior exam):  Recent CT. PROTOCOL:  Routine brain. _______________ FINDINGS:       IMAGE QUALITY:  The images are overall mildly degraded by motion artifact. BRAIN AND EXTRA-AXIAL SPACE:           ACUTE/SUBACUTE INFARCT:  Small patchy foci in the right MCA territory involving the right subcentral gyrus and right external capsule posteriorly on axial DWI series 4 images 18-20. These are embolic. MASS:  None. HEMORRHAGE:  None. SUBDURAL FLUID COLLECTION:  None. HYDROCEPHALUS:  None. ICA AND DOMINANT VA T2 FLOW VOIDS:  Tortuous. REMOTE CEREBRAL TERRITORIAL INFARCT:  None definite. REMOTE CEREBELLAR INFARCT:  None definite.            STRIVE (STandards for Reporting Vascular changes on nEuroimaging):                            --[place] of white matter hyperintensity (\"leukoaraiosis\") of presumed vascular origin:  Low-grade, prominently localized about the left lentiform nucleus. --[place] of chronic lacunes of presumed vascular origin:  None by 3 mm STRIVE criteria. --[place] of perivascular spaces: Moderate. --[place] of \"microbleeds\":   None definite. --Degree of brain atrophy:  None significant. SELLA/PITUITARY:  Unremarkable. HEENT:            ORBITS:  Unremarkable. PARANASAL SINUSES:  Low-grade left maxillary sinus mucosal thickening. MASTOID AIR CELLS:  Predominantly clear. INCLUDED UPPER CERVICAL LYMPH NODES:  Unremarkable. INCLUDED UPPER PAROTIDS:  Unremarkable. NASOPHARYNX:  Unremarkable. BONE MARROW SIGNAL:  Unremarkable. PARTIALLY IMAGED UPPER CERVICAL SPINE:  The degenerative facet arthropathy. SUPERFICIAL SOFT TISSUES: Unremarkable. _______________     Small embolic acute/subacute right perisylvian MCA territory infarct. _______________     CT HEAD WO CONT    Result Date: 3/27/2022  EXAM: CT HEAD WO CONT CLINICAL INDICATION/HISTORY: stroke -Additional: None COMPARISON: MRI brain and CT head from same day TECHNIQUE: Axial CT imaging of the head was performed without intravenous contrast. One or more dose reduction techniques were used on this CT: automated exposure control, adjustment of the mAs and/or kVp according to patient size, and iterative reconstruction techniques. The specific techniques used on this CT exam have been documented in the patient's electronic medical record. Digital Imaging and Communications in Medicine (DICOM) format image data are available to nonaffiliated external healthcare facilities or entities on a secure, media free, reciprocally searchable basis with patient authorization for at least a 12-month period after this study. _______________ FINDINGS: BRAIN:   > Brain volume: Age appropriate. > White matter: Little or no white matter disease. > Infarcts, encephalomalacia: Small right posterior frontal deep point matter subtle lucency, sight of prior same day's infarct on MRI. Stable anterior left frontal white matter and insular chronic lucency. > Parenchymal mass: None.   > Parenchymal hemorrhage: None.   > Midline shift: None.   > Miscellaneous: None. EXTRA-AXIAL SPACES: Unremarkable. No fluid collections. CALVARIUM: Intact. SINUSES, MASTOIDS: Clear. OTHER EXTRACRANIAL: Unremarkable. _______________     1. No acute intra-axial hemorrhage. 2. Small developing lucency in the posterior right frontal lobe deep white matter, expected maturational changes of known infarct on preceding same day's MRI. CT HEAD WO CONT    Result Date: 3/26/2022  EXAM: CT HEAD WO CONT CLINICAL INDICATION/HISTORY: code s -Additional: CODE S COMPARISON: None TECHNIQUE: Axial CT imaging of the head was performed without intravenous contrast. One or more dose reduction techniques were used on this CT: automated exposure control, adjustment of the mAs and/or kVp according to patient size, and iterative reconstruction techniques. The specific techniques used on this CT exam have been documented in the patient's electronic medical record. Digital Imaging and Communications in Medicine (DICOM) format image data are available to nonaffiliated external healthcare facilities or entities on a secure, media free, reciprocally searchable basis with patient authorization for at least a 12-month period after this study. _______________ FINDINGS: BRAIN:   > Brain volume: Age appropriate.   > White matter: Little or no white matter disease. > Infarcts, encephalomalacia: Minimal lucency in the Left frontal lobe deep white matter lucency adjacent to the basal ganglia and superior insular white matter. > Parenchymal mass: None.   > Parenchymal hemorrhage: None.   > Midline shift: None.   > Miscellaneous: None.  EXTRA-AXIAL SPACES: Unremarkable. No fluid collections. CALVARIUM: Intact. SINUSES, MASTOIDS: Clear. OTHER EXTRACRANIAL: Unremarkable. _______________     1. Age-indeterminate left frontal lobe deep white matter lucency, without mass effect. Differential considerations would include chronic small vessel ischemic changes versus white matter infarct. 2. No acute intra-axial hemorrhage. Code S report directly communicated to [employer] on 3/26/2022 6:55 PM. Results understood and acknowledged. CTA HEAD NECK W WO CONT    Addendum Date: 3/27/2022    Addendum: There is a tiny calcified embolus in a right perisylvian MCA M2/M3 branch vessel. Result Date: 3/27/2022  EXAM: CT arteriogram of the head and neck. INDICATION:  \"code s. \" ADDITIONAL HISTORY:  Facial droop. COMPARISON:  No prior CTA or MRA available for comparison. TECHNIQUE:  Three-dimensional surface renderings, maximum intensity projection reformations, and curved planar reformations were post-processed at a dedicated outside facility [employer]). Percent stenoses are reported with reference to the downstream lumen (\"NASCET\" method). DOSE REDUCTION AND DICOM INFORMATION: One or more dose reduction techniques were used on this CT: automated exposure control, adjustment of the mAs and/or kVp according to patient size, and iterative reconstruction techniques. The specific techniques used on this CT exam have been documented in the patient's electronic medical record. Digital Imaging and Communications in Medicine (DICOM) format image data are available to nonaffiliated external healthcare facilities or entities on a secure, media free, reciprocally searchable basis with patient authorization for at least a 12-month period after this study. _______________ FINDINGS:      ARTERIOGRAPHIC BOLUS QUALITY:  Diagnostic. GENERALIZED HEAD AND NECK ATHEROSCLEROTIC BURDEN:  Moderate. VASCULAR TORTUOSITY:  Marked.       AORTIC ARCH VESSELS:  Conventional 3 vessel anatomy. Brachiocephalic:  No significant stenosis. RSubClav:  No significant proximal stenosis. LSubClav:  No significant proximal stenosis. RIGHT CAROTID ARTERY:           CCA:  Widely patent. ICA (extracranial):  Calcified atherosclerotic plaque at the bulb results in approximately 50% stenosis. Intracranial ICA:  Widely patent. LEFT CAROTID ARTERY:              CCA:  Widely patent. ICA (extracranial): Widely patent. Atherosclerotic plaque at the ICA bulb results in 0% extracranial ICA stenosis. Intracranial ICA:  Widely patent. VERTEBRAL ARTERIES, PICAs, SCAs:           VA dominance:  Markedly right. Right VA:  Widely patent. Left VA:  Origin stenosis, partially obtained by local streak artifact, potentially high-grade. Otherwise unremarkable. PICAs:  Opacified bilaterally. SCAs:  Opacified bilaterally. BASILAR AND CEREBRAL ARTERIES:            3D image quality:  Diagnostic. ANEURYSMS: No saccular aneurysm is detected. RMCA:  Unremarkable. RACA:   Unremarkable. LMCA:   Unremarkable. LACA:   Unremarkable. Basilar artery:   Unremarkable. RPCA:   Unremarkable. LPCA:   Unremarkable. COW anatomy summary:                 RACA A1:  Intermediate caliber. LACA A1:  Intermediate caliber. aComm: Hypoplastic. RpComm:  Hypoplastic/absent. LpComm:  Hypoplastic/absent. RPCA P1:  Intermediate caliber. LPCA P1:  Intermediate caliber. DURAL VENOUS SINUSES: Incidentally opacified and no evidence of acute thrombosis is detected.  _______________        ACUTE FINDINGS: None. CTA NECK:            HUBER:  Approximately 50% bulb stenosis.            LICA:  0% stenosis. RVA:  Dominant, widely patent. LVA:  Origin stenosis, partially obscured, suspected to be high-grade. CTA BRAIN: Negative. _______________ Note: This final report is without significant discrepancy relative to the preliminary report. XR CHEST PORT    Result Date: 3/27/2022  --------------------------------------------------------------------------- <<<<<<<<<           [facility]           >>>>>>>>> --------------------------------------------------------------------------- CLINICAL HISTORY:  Cerebrovascular accident. COMPARISON EXAMINATIONS:  None. ---  SINGLE FRONTAL VIEW OF THE CHEST  --- The lungs and pleural spaces are clear. The mediastinum is unremarkable in appearance. No significant osseous abnormalities are identified.  --------------    -------------- No evidence for active cardiopulmonary disease. ASSESSMENT/IMPRESSION:   Right MCA infarct, status post TPA. The patient needs to be on aspirin 81 mg and atorvastatin 80 mg daily. The infarct seems to be secondary to artery to artery emboli. We are going to follow the patient as outpatient regularly. For now, it is reasonable to transfer the patient from ICU to telemetry floor. The patient can be discharged home tomorrow. I discussed my impressions with the patient, her daughter and her son in great detail. PLAN/RECOMMENDATIONS:  1. Continue aspirin 81 mg and atorvastatin 80 mg daily  2. Normotensive protocol can be initiated. Please avoid hypotension  3.  Echocardiogram is pending. 4.  Resume outpatient antihypertensive regimen. 5.  PT/OT and SLP. 6.  It is reasonable to transfer the patient to telemetry floor. I will follow the patient. Please do not hesitate to return with any questions.     Signed:  Vin Peterson MD  3/28/2022  1:36 PM

## 2022-03-28 NOTE — PROGRESS NOTES
Bedside and Verbal shift change report given to [facility] RN(oncoming nurse) by Oscar Pinto RN (offgoing nurse). Report included the following information SBAR, Kardex, Intake/Output, MAR and Cardiac Rhythm .

## 2022-03-28 NOTE — PROGRESS NOTES
VASCULAR AND TRANSPLANT       PROGRESS NOTE    Date: 3/28/2022        Plan:   Continue current medical management. No vascular surgical intervention at this time. Assessment:   60 yo female with right middle cerebral infarct. <50% right ica stenosis. Subjective:   Pt states she is feeling better. Objective:   Admit weight: Weight: 87.1 kg (192 lb)  Last recorded weight: Weight: 86.2 kg (190 lb)    Temp (24hrs), Av.1 °F (36.7 °C), Min:97.3 °F (36.3 °C), Max:98.5 °F (36.9 °C)          [unfilled]    Intake/Output Summary (Last 24 hours) at 3/28/2022 1652  Last data filed at 3/28/2022 1200  Gross per 24 hour   Intake 1250 ml   Output    Net 1250 ml       Still with left sided facial droop. Some decreased strength left arm. Labs:     BMP:   Recent Labs     22  0421 22  0526 22  1824   BUN 16 18 18    140 139   CO2 28 31 27     CBC:    Recent Labs     22  0421 22  0526 22  1825   WBC 5.6 5.9 8.2   HCT 35.0 36.7 43.6   RDW 14.3 14.3 14.2     PT/INR: No results for input(s): INR, INREXT in the last 72 hours. No lab exists for component: PT    Arterial Blood Gases   No results found for: LPMO2, FIO2, PEEP, PH1, PCO2, PO2, HCO3, MODE     MICRO:  Results     ** No results found for the last 336 hours. Tyshawn Duran PA-C   [phone].

## 2022-03-28 NOTE — PROGRESS NOTES
Problem: Dysphagia (Adult)  Description: Patient will:  1. Tolerate PO trials with 0 s/s overt distress in 4/5 trials  2. Utilize compensatory swallow strategies/maneuvers (decrease bite/sip, size/rate, alt. liq/sol) with min cues in 4/5 trials  3. Perform oral-motor/laryngeal exercises to increase oropharyngeal swallow function with min cues  4. Complete an objective swallow study (i.e., MBSS) to assess swallow integrity, r/o aspiration, and determine of safest LRD, min A    Rec:     Easy to Chew with Nectar/mildly thick liquids  Aspiration precautions  HOB >45 during po intake, remain >30 for 30-45 minutes after po   Small bites/sips; alternate liquid/solid with slow feeding rate   Oral care TID  Meds as tolerated  Outcome: Progressing Towards Goal    SPEECH LANGUAGE PATHOLOGY DYSPHAGIA TREATMENT    Patient: Andi Pyle (69 y.o. female)  Date: 3/28/2022  Diagnosis: Stroke [facility]) [I63.9] Stroke [facility])       Precautions: Aspiration, Fall    PLOF: Regular diet    ASSESSMENT:  Patient seen by ST for dysphagia and speech language follow-up s/p CVA. Patient upright in bed with daughter in room. A/O x 4. Patient states that she is still having difficulty with thin liquids (I.e., coughing) but has not attempted thins today. Patient tolerated small amounts of ice chips via spoon with no overt s/sx of aspiration or distress. Patient demo immediate and delayed cough with teaspoon and straw sip of thin liquids. Patient instructed to take sip, hold liquid, tuck chin, and swallow. Patient demo chin tuck x 5 with thin liquids and demo no overt s/sx of aspiration visualized. Patient reports that swallow is easier with chin tuck (\"i'm not getting that tickle feeling that prompts a cough. \"  Recommend continue current diet with mildly thickened liquids. ST to follow. D/w nurse. Speech: patient reports that she continues to have difficulty with drooling, likely due to facial droop.  Patient's speech 100% intelligible during ST; however, patient reports increased difficulty as she fatigues. Patient reports that she is able to communicate needs in inpatient setting at this time. Progression toward goals:  [x]         Improving appropriately and progressing toward goals  []         Improving slowly and progressing toward goals  []         Not making progress toward goals and plan of care will be adjusted     PLAN:  Recommendations and Planned Interventions:  See above  Patient continues to benefit from skilled intervention to address the above impairments. Continue treatment per established plan of care. Discharge Recommendations: To Be Determined     SUBJECTIVE:   Patient stated Alex Bernard I have a blanket. OBJECTIVE:   Cognitive and Communication Status:  Neurologic State: Alert  Orientation Level: Oriented X4  Cognition: Appropriate decision making,Appropriate for age attention/concentration  Perception: Appears intact  Perseveration: No perseveration noted  Safety/Judgement: Awareness of environment  Dysphagia Treatment:  Oral Assessment:  Oral Assessment  Labial: Left droop  Dentition: Intact  Oral Hygiene: adequate  Lingual: Decreased rate  Velum: Unable to visualize  Mandible: No impairment  Gag Reflex: No impairment  P.O.  Trials:   Patient Position:  (upright in bed)   Vocal quality prior to P.O.: No impairment   Consistency Presented: Ice chips,Thin liquid   How Presented: Self-fed/presented,SLP-fed/presented,Spoon,Straw,Successive swallows       Bolus Acceptance: No impairment   Bolus Formation/Control: Impaired   Type of Impairment: Other (comment) (suspect premature spillage)   Propulsion: No impairment   Oral Residue: None   Initiation of Swallow: Delayed (# of seconds)   Laryngeal Elevation: Functional   Aspiration Signs/Symptoms: Delayed cough/throat clear,Strong cough,Watery eyes   Pharyngeal Phase Characteristics: Easily fatigued ,Suspected pharyngeal residue   Effective Modifications: Chin tuck,Small sips and bites   Cues for Modifications: Minimal-moderate         Oral Phase Severity: Mild   Pharyngeal Phase Severity : Mild     PAIN:  Pain level pre-treatment: 010   Pain level post-treatment: 0/10     After treatment:   []              Patient left in no apparent distress sitting up in chair  []              Patient left in no apparent distress in bed  []              Call bell left within reach  [x]              Nursing notified  [x]              Family present  []              Caregiver present  []              Bed alarm activated      COMMUNICATION/EDUCATION:   [x] Aspiration precautions; swallow safety; compensatory techniques  []        Patient unable to participate in education; education ongoing with staff  []  Posted safety precautions in patient's room.   [] Oral-motor/laryngeal strengthening exercises      Princess Brandon SLP  Time Calculation: 13 mins

## 2022-03-28 NOTE — PROGRESS NOTES
Speech Therapy Treatment Attempt     Chart reviewed. Attempted Speech Therapy Treatment, however, patient unable to be seen due to:  []  Nausea/vomiting  []  Eating  []  Pain  []  Patient too lethargic  []  Off Unit for testing/procedure  []  Dialysis treatment in progress  []  Telemetry Results  [x]  Other: 11:18 attempt; patient with visitor and requested that ST return at later time. Will f/u later as patient's schedule allows.   Ava Mcnair, SLP

## 2022-03-29 VITALS
OXYGEN SATURATION: 98 % | TEMPERATURE: 98.3 F | SYSTOLIC BLOOD PRESSURE: 155 MMHG | WEIGHT: 190 LBS | HEIGHT: 64 IN | DIASTOLIC BLOOD PRESSURE: 93 MMHG | HEART RATE: 69 BPM | BODY MASS INDEX: 32.44 KG/M2 | RESPIRATION RATE: 19 BRPM

## 2022-03-29 LAB
ALBUMIN SERPL-MCNC: 3.2 G/DL (ref 3.4–5)
ALBUMIN/GLOB SERPL: 1 {RATIO} (ref 0.8–1.7)
ALP SERPL-CCNC: 81 U/L (ref 45–117)
ALT SERPL-CCNC: 21 U/L (ref 13–56)
ANION GAP SERPL CALC-SCNC: 4 MMOL/L (ref 3–18)
AST SERPL-CCNC: 13 U/L (ref 10–38)
ATRIAL RATE: 86 BPM
BILIRUB SERPL-MCNC: 0.4 MG/DL (ref 0.2–1)
BUN SERPL-MCNC: 21 MG/DL (ref 7–18)
BUN/CREAT SERPL: 23 (ref 12–20)
CALCIUM SERPL-MCNC: 8.9 MG/DL (ref 8.5–10.1)
CALCULATED P AXIS, ECG09: 57 DEGREES
CALCULATED R AXIS, ECG10: 26 DEGREES
CALCULATED T AXIS, ECG11: 52 DEGREES
CHLORIDE SERPL-SCNC: 109 MMOL/L (ref 100–111)
CO2 SERPL-SCNC: 29 MMOL/L (ref 21–32)
CREAT SERPL-MCNC: 0.9 MG/DL (ref 0.6–1.3)
DIAGNOSIS, 93000: NORMAL
ERYTHROCYTE [DISTWIDTH] IN BLOOD BY AUTOMATED COUNT: 14.4 % (ref 11.6–14.5)
GLOBULIN SER CALC-MCNC: 3.3 G/DL (ref 2–4)
GLUCOSE BLD STRIP.AUTO-MCNC: 111 MG/DL (ref 70–110)
GLUCOSE BLD STRIP.AUTO-MCNC: 138 MG/DL (ref 70–110)
GLUCOSE BLD STRIP.AUTO-MCNC: 88 MG/DL (ref 70–110)
GLUCOSE SERPL-MCNC: 99 MG/DL (ref 74–99)
HCT VFR BLD AUTO: 37.1 % (ref 35–45)
HGB BLD-MCNC: 11.4 G/DL (ref 12–16)
LEFT CCA DIST DIAS: 13 CM/S
LEFT CCA DIST SYS: 62.1 CM/S
LEFT CCA PROX DIAS: 15.5 CM/S
LEFT CCA PROX SYS: 105.1 CM/S
LEFT ECA DIAS: 6.9 CM/S
LEFT ECA SYS: 76.8 CM/S
LEFT ICA DIST DIAS: 29.3 CM/S
LEFT ICA DIST SYS: 98.1 CM/S
LEFT ICA MID DIAS: 40 CM/S
LEFT ICA MID SYS: 113.2 CM/S
LEFT ICA PROX DIAS: 30.2 CM/S
LEFT ICA PROX SYS: 98.9 CM/S
LEFT ICA/CCA SYS: 1.8
LEFT VERTEBRAL DIAS: 10.6 CM/S
LEFT VERTEBRAL SYS: 34.3 CM/S
MCH RBC QN AUTO: 25.1 PG (ref 24–34)
MCHC RBC AUTO-ENTMCNC: 30.7 G/DL (ref 31–37)
MCV RBC AUTO: 81.5 FL (ref 78–100)
NRBC # BLD: 0 K/UL (ref 0–0.01)
NRBC BLD-RTO: 0 PER 100 WBC
P-R INTERVAL, ECG05: 172 MS
PLATELET # BLD AUTO: 247 K/UL (ref 135–420)
PMV BLD AUTO: 10.1 FL (ref 9.2–11.8)
POTASSIUM SERPL-SCNC: 4.1 MMOL/L (ref 3.5–5.5)
PROT SERPL-MCNC: 6.5 G/DL (ref 6.4–8.2)
Q-T INTERVAL, ECG07: 338 MS
QRS DURATION, ECG06: 74 MS
QTC CALCULATION (BEZET), ECG08: 404 MS
RBC # BLD AUTO: 4.55 M/UL (ref 4.2–5.3)
RIGHT CCA DIST DIAS: 15.1 CM/S
RIGHT CCA DIST SYS: 56.9 CM/S
RIGHT CCA PROX DIAS: 17.7 CM/S
RIGHT CCA PROX SYS: 102.5 CM/S
RIGHT ECA DIAS: 11.2 CM/S
RIGHT ECA SYS: 112.9 CM/S
RIGHT ICA DIST DIAS: 27.7 CM/S
RIGHT ICA DIST SYS: 78.1 CM/S
RIGHT ICA MID DIAS: 43 CM/S
RIGHT ICA MID SYS: 119.8 CM/S
RIGHT ICA PROX DIAS: 32 CM/S
RIGHT ICA PROX SYS: 115.4 CM/S
RIGHT ICA/CCA SYS: 2.1
RIGHT VERTEBRAL DIAS: 19.2 CM/S
RIGHT VERTEBRAL SYS: 57.2 CM/S
SODIUM SERPL-SCNC: 142 MMOL/L (ref 136–145)
VAS LEFT SUBCLAVIAN PROX EDV: 0 CM/S
VAS LEFT SUBCLAVIAN PROX PSV: 81.3 CM/S
VAS RIGHT SUBCLAVIAN PROX EDV: 0 CM/S
VAS RIGHT SUBCLAVIAN PROX PSV: 78.1 CM/S
VENTRICULAR RATE, ECG03: 86 BPM
WBC # BLD AUTO: 5.7 K/UL (ref 4.6–13.2)

## 2022-03-29 PROCEDURE — 80053 COMPREHEN METABOLIC PANEL: CPT

## 2022-03-29 PROCEDURE — 74011000250 HC RX REV CODE- 250: Performed by: FAMILY MEDICINE

## 2022-03-29 PROCEDURE — 92526 ORAL FUNCTION THERAPY: CPT

## 2022-03-29 PROCEDURE — 92507 TX SP LANG VOICE COMM INDIV: CPT

## 2022-03-29 PROCEDURE — 97530 THERAPEUTIC ACTIVITIES: CPT

## 2022-03-29 PROCEDURE — 36415 COLL VENOUS BLD VENIPUNCTURE: CPT

## 2022-03-29 PROCEDURE — 74011250637 HC RX REV CODE- 250/637: Performed by: INTERNAL MEDICINE

## 2022-03-29 PROCEDURE — 85027 COMPLETE CBC AUTOMATED: CPT

## 2022-03-29 PROCEDURE — 74011250636 HC RX REV CODE- 250/636: Performed by: FAMILY MEDICINE

## 2022-03-29 PROCEDURE — 82962 GLUCOSE BLOOD TEST: CPT

## 2022-03-29 RX ORDER — FAMOTIDINE 20 MG/1
20 TABLET, FILM COATED ORAL 2 TIMES DAILY
Status: DISCONTINUED | OUTPATIENT
Start: 2022-03-29 | End: 2022-03-29 | Stop reason: HOSPADM

## 2022-03-29 RX ORDER — GUAIFENESIN 100 MG/5ML
81 LIQUID (ML) ORAL DAILY
Qty: 30 TABLET | Refills: 5 | Status: SHIPPED | OUTPATIENT
Start: 2022-03-30

## 2022-03-29 RX ORDER — ATORVASTATIN CALCIUM 80 MG/1
80 TABLET, FILM COATED ORAL
Qty: 30 TABLET | Refills: 2 | Status: SHIPPED | OUTPATIENT
Start: 2022-03-29

## 2022-03-29 RX ADMIN — ASPIRIN 81 MG: 81 TABLET, CHEWABLE ORAL at 08:33

## 2022-03-29 RX ADMIN — FAMOTIDINE 20 MG: 10 INJECTION, SOLUTION INTRAVENOUS at 08:33

## 2022-03-29 NOTE — DISCHARGE INSTRUCTIONS
DISCHARGE SUMMARY from Nurse    PATIENT INSTRUCTIONS:    After general anesthesia or intravenous sedation, for 24 hours or while taking prescription Narcotics:  · Limit your activities  · Do not drive and operate hazardous machinery  · Do not make important personal or business decisions  · Do  not drink alcoholic beverages  · If you have not urinated within 8 hours after discharge, please contact your surgeon on call. Report the following to your surgeon:  · Excessive pain, swelling, redness or odor of or around the surgical area  · Temperature over 100.5  · Nausea and vomiting lasting longer than 4 hours or if unable to take medications  · Any signs of decreased circulation or nerve impairment to extremity: change in color, persistent  numbness, tingling, coldness or increase pain  · Any questions    What to do at Home:  Recommended activity: Activity as tolerated,     If you experience any of the following symptoms , please follow up with . *  Please give a list of your current medications to your Primary Care Provider. *  Please update this list whenever your medications are discontinued, doses are      changed, or new medications (including over-the-counter products) are added. *  Please carry medication information at all times in case of emergency situations. These are general instructions for a healthy lifestyle:    No smoking/ No tobacco products/ Avoid exposure to second hand smoke  Surgeon General's Warning:  Quitting smoking now greatly reduces serious risk to your health.     Obesity, smoking, and sedentary lifestyle greatly increases your risk for illness    A healthy diet, regular physical exercise & weight monitoring are important for maintaining a healthy lifestyle    You may be retaining fluid if you have a history of heart failure or if you experience any of the following symptoms:  Weight gain of 3 pounds or more overnight or 5 pounds in a week, increased swelling in our hands or feet or shortness of breath while lying flat in bed. Please call your doctor as soon as you notice any of these symptoms; do not wait until your next office visit. The discharge information has been reviewed with the patient. The patient verbalized understanding. Discharge medications reviewed with the patient  Patient Education        High Blood Pressure: Care Instructions  Overview     It's normal for blood pressure to go up and down throughout the day. But if it stays up, you have high blood pressure. Another name for high blood pressure is hypertension. Despite what a lot of people think, high blood pressure usually doesn't cause headaches or make you feel dizzy or lightheaded. It usually has no symptoms. But it does increase your risk of stroke, heart attack, and other problems. You and your doctor will talk about your risks of these problems based on your blood pressure. Your doctor will give you a goal for your blood pressure. Your goal will be based on your health and your age. Lifestyle changes, such as eating healthy and being active, are always important to help lower blood pressure. You might also take medicine to reach your blood pressure goal.  Follow-up care is a key part of your treatment and safety. Be sure to make and go to all appointments, and call your doctor if you are having problems. It's also a good idea to know your test results and keep a list of the medicines you take. How can you care for yourself at home? Medical treatment  · If you stop taking your medicine, your blood pressure will go back up. You may take one or more types of medicine to lower your blood pressure. Be safe with medicines. Take your medicine exactly as prescribed. Call your doctor if you think you are having a problem with your medicine. · Talk to your doctor before you start taking aspirin every day. Aspirin can help certain people lower their risk of a heart attack or stroke.  But taking aspirin isn't right for everyone, because it can cause serious bleeding. · See your doctor regularly. You may need to see the doctor more often at first or until your blood pressure comes down. · If you are taking blood pressure medicine, talk to your doctor before you take decongestants or anti-inflammatory medicine, such as ibuprofen. Some of these medicines can raise blood pressure. · Learn how to check your blood pressure at home. Lifestyle changes  · Stay at a healthy weight. This is especially important if you put on weight around the waist. Losing even 10 pounds can help you lower your blood pressure. · If your doctor recommends it, get more exercise. Walking is a good choice. Bit by bit, increase the amount you walk every day. Try for at least 30 minutes on most days of the week. You also may want to swim, bike, or do other activities. · Avoid or limit alcohol. Talk to your doctor about whether you can drink any alcohol. · Try to limit how much sodium you eat to less than 2,300 milligrams (mg) a day. Your doctor may ask you to try to eat less than 1,500 mg a day. · Eat plenty of fruits (such as bananas and oranges), vegetables, legumes, whole grains, and low-fat dairy products. · Lower the amount of saturated fat in your diet. Saturated fat is found in animal products such as milk, cheese, and meat. Limiting these foods may help you lose weight and also lower your risk for heart disease. · Do not smoke. Smoking increases your risk for heart attack and stroke. If you need help quitting, talk to your doctor about stop-smoking programs and medicines. These can increase your chances of quitting for good. When should you call for help? Call 911  anytime you think you may need emergency care. This may mean having symptoms that suggest that your blood pressure is causing a serious heart or blood vessel problem. Your blood pressure may be over 180/120.   For example, call 911 if:    · You have symptoms of a heart attack. These may include:  ? Chest pain or pressure, or a strange feeling in the chest.  ? Sweating. ? Shortness of breath. ? Nausea or vomiting. ? Pain, pressure, or a strange feeling in the back, neck, jaw, or upper belly or in one or both shoulders or arms. ? Lightheadedness or sudden weakness. ? A fast or irregular heartbeat.     · You have symptoms of a stroke. These may include:  ? Sudden numbness, tingling, weakness, or loss of movement in your face, arm, or leg, especially on only one side of your body. ? Sudden vision changes. ? Sudden trouble speaking. ? Sudden confusion or trouble understanding simple statements. ? Sudden problems with walking or balance. ? A sudden, severe headache that is different from past headaches.     · You have severe back or belly pain. Do not wait until your blood pressure comes down on its own. Get help right away. Call your doctor now or seek immediate care if:    · Your blood pressure is much higher than normal (such as 180/120 or higher), but you don't have symptoms.     · You think high blood pressure is causing symptoms, such as:  ? Severe headache.  ? Blurry vision. Watch closely for changes in your health, and be sure to contact your doctor if:    · Your blood pressure measures higher than your doctor recommends at least 2 times. That means the top number is higher or the bottom number is higher, or both.     · You think you may be having side effects from your blood pressure medicine. Where can you learn more? Go to http://www.gray.com/  Enter [id number] in the search box to learn more about \"High Blood Pressure: Care Instructions. \"  Current as of: January 10, 2022               Content Version: 13.2  © 0177-6078 [employer]. Care instructions adapted under license by [employer] (which disclaims liability or warranty for this information).  If you have questions about a medical condition or this instruction, always ask your healthcare professional. [place] [address] any warranty or liability for your use of this information. Patient Education        Stroke: Care Instructions  Overview     A stroke is damage to the brain that occurs when a blood vessel in the brain bursts or is blocked by a blood clot. Without blood and the oxygen it carries, part of the brain is damaged. The part of your body controlled by that part of your brain may not function properly now. The brain is an amazing organ that can heal itself to some degree. The stroke you had damaged part of your brain. But other parts of your brain may take over in some way for the damaged areas. Your doctor will talk with you about what you can do to prevent another stroke. You can help by managing other health problems that raise your risk, such as atrial fibrillation or high blood pressure. Have a heart-healthy lifestyle which includes being active, eating healthy foods, staying at a healthy weight, and not smoking. You may also take medicine that prevents blood clots. Enter a stroke rehabilitation (rehab) program if your doctor recommends it. Stroke rehab is training and therapy to help you recover, prevent problems, and relearn how to do everyday things you have not been able to do since your stroke. The focus will depend on how the stroke has affected your ability to do the things you want and need to do. Follow-up care is a key part of your treatment and safety. Be sure to make and go to all appointments, and call your doctor if you are having problems. It's also a good idea to know your test results and keep a list of the medicines you take. How can you care for yourself at home?    · Attend stroke rehabilitation (rehab) if your doctor recommends it.  Your rehab plan will be based on your goals and how the stroke affected you.     · You will get instructions on how to manage specific problems that you might have because of the stroke.     · Manage other health problems that raise your risk of another stroke. These include atrial fibrillation, diabetes, high blood pressure, and high cholesterol.     · Have a heart-healthy lifestyle. ? Don't smoke and avoid secondhand smoke. ? Limit alcohol to 2 drinks a day for men and 1 drink a day for women. ? Stay at a healthy weight. Lose weight if you need to.  ? Be active. Ask your doctor what type and level of activity is safe for you.  ? Eat heart-healthy foods. These include vegetables, fruits, nuts, beans, lean meat, fish, and whole grains. Limit sodium and sugar.     · If you think you may have a problem with alcohol or drug use, talk to your doctor. Medicines    · Be safe with medicines. Take your medicines exactly as prescribed. Call your doctor if you think you are having a problem with your medicine. You will get more details on the specific medicines your doctor prescribes.     · You may take a few medicines to help lower your risk of another stroke. These include:  ? Blood pressure medicine such as an ACE (angiotensin-converting enzyme) inhibitor, angiotensin II receptor blocker (ARBs), or diuretic. ? Cholesterol medicine such as a statin. ? Aspirin or another blood thinner to prevent blood clots.     · If your doctor prescribed a blood thinner, be sure you get instructions about how to take your medicine safely. Blood thinners can cause serious bleeding problems.     · Do not take any over-the-counter medicines or herbal products without talking to your doctor first.     · If you take hormonal birth control or hormone therapy, talk to your doctor about whether they are right for you. They may raise the risk of stroke in some people. For caregivers    · Make the home safe. You may get advice from the stroke rehab team about what changes might be needed. Here are some examples. Set up a bedroom that does not require climbing stairs.  Be sure the bathroom is on the same floor. Move throw rugs and furniture that could cause falls. Make sure that the lighting is good. Put grab bars and seats in tubs and showers.     · Provide transportation until they can drive again.     · Find out what they can do and what they need help with. Try not to do things that they can do on their own. Help them learn and practice new skills.     · Visit and talk with them often. Try doing activities together that you both enjoy, such as playing cards or board games. Encourage other people to visit too.     · Take care of yourself. Here are some tips that might help. Do not try to do everything yourself. Ask the stroke rehab team for help. Ask friends and family members to help. Eat well, get enough rest, and take time to do things that you enjoy. Keep up with your own doctor visits, and make sure to take your medicines regularly. Join a local support group. Find out if you qualify for home health care visits to help with rehab or for adult day care. When should you call for help? Call 911 anytime you think you may need emergency care. For example, call if:    · You have signs of another stroke. These may include:  ? Sudden numbness, tingling, weakness, or loss of movement in your face, arm, or leg, especially on only one side of your body. ? Sudden vision changes. ? Sudden trouble speaking. ? Sudden confusion or trouble understanding simple statements. ? Sudden problems with walking or balance. ? A sudden, severe headache that is different from past headaches. ? Fainting. ? A seizure. Call 911 even if these symptoms go away in a few minutes. Call your doctor now or seek immediate medical care if:    · You have new symptoms that may be related to your stroke, such as falls or trouble swallowing. Watch and call if:    · You have been feeling sad, depressed, or hopeless, or you have lost interest in things that you usually enjoy.     · You have anxiety or fear that affects your life. Watch closely for changes in your health, and be sure to contact your doctor if you have any problems. Where can you learn more? Go to http://www.gray.com/  Enter C294 in the search box to learn more about \"Stroke: Care Instructions. \"  Current as of: July 6, 2021               Content Version: 13.2  © 2006-2022 [employer]. Care instructions adapted under license by [employer] (which disclaims liability or warranty for this information). If you have questions about a medical condition or this instruction, always ask your healthcare professional. [address] any warranty or liability for your use of this information. and appropriate educational materials and side effects teaching were provided. ___________________________________________________________________________________________________________________________________    Patient armband removed and shredded. Patient Education        Stroke: Care Instructions  Overview     A stroke is damage to the brain that occurs when a blood vessel in the brain bursts or is blocked by a blood clot. Without blood and the oxygen it carries, part of the brain is damaged. The part of your body controlled by that part of your brain may not function properly now. The brain is an amazing organ that can heal itself to some degree. The stroke you had damaged part of your brain. But other parts of your brain may take over in some way for the damaged areas. Your doctor will talk with you about what you can do to prevent another stroke. You can help by managing other health problems that raise your risk, such as atrial fibrillation or high blood pressure. Have a heart-healthy lifestyle which includes being active, eating healthy foods, staying at a healthy weight, and not smoking. You may also take medicine that prevents blood clots.   Enter a stroke rehabilitation (rehab) program if your doctor recommends it. Stroke rehab is training and therapy to help you recover, prevent problems, and relearn how to do everyday things you have not been able to do since your stroke. The focus will depend on how the stroke has affected your ability to do the things you want and need to do. Follow-up care is a key part of your treatment and safety. Be sure to make and go to all appointments, and call your doctor if you are having problems. It's also a good idea to know your test results and keep a list of the medicines you take. How can you care for yourself at home?    · Attend stroke rehabilitation (rehab) if your doctor recommends it. Your rehab plan will be based on your goals and how the stroke affected you.     · You will get instructions on how to manage specific problems that you might have because of the stroke.     · Manage other health problems that raise your risk of another stroke. These include atrial fibrillation, diabetes, high blood pressure, and high cholesterol.     · Have a heart-healthy lifestyle. ? Don't smoke and avoid secondhand smoke. ? Limit alcohol to 2 drinks a day for men and 1 drink a day for women. ? Stay at a healthy weight. Lose weight if you need to.  ? Be active. Ask your doctor what type and level of activity is safe for you.  ? Eat heart-healthy foods. These include vegetables, fruits, nuts, beans, lean meat, fish, and whole grains. Limit sodium and sugar.     · If you think you may have a problem with alcohol or drug use, talk to your doctor. Medicines    · Be safe with medicines. Take your medicines exactly as prescribed. Call your doctor if you think you are having a problem with your medicine. You will get more details on the specific medicines your doctor prescribes.     · You may take a few medicines to help lower your risk of another stroke. These include:  ?  Blood pressure medicine such as an ACE (angiotensin-converting enzyme) inhibitor, angiotensin II receptor blocker (ARBs), or diuretic. ? Cholesterol medicine such as a statin. ? Aspirin or another blood thinner to prevent blood clots.     · If your doctor prescribed a blood thinner, be sure you get instructions about how to take your medicine safely. Blood thinners can cause serious bleeding problems.     · Do not take any over-the-counter medicines or herbal products without talking to your doctor first.     · If you take hormonal birth control or hormone therapy, talk to your doctor about whether they are right for you. They may raise the risk of stroke in some people. For caregivers    · Make the home safe. You may get advice from the stroke rehab team about what changes might be needed. Here are some examples. Set up a bedroom that does not require climbing stairs. Be sure the bathroom is on the same floor. Move throw rugs and furniture that could cause falls. Make sure that the lighting is good. Put grab bars and seats in tubs and showers.     · Provide transportation until they can drive again.     · Find out what they can do and what they need help with. Try not to do things that they can do on their own. Help them learn and practice new skills.     · Visit and talk with them often. Try doing activities together that you both enjoy, such as playing cards or board games. Encourage other people to visit too.     · Take care of yourself. Here are some tips that might help. Do not try to do everything yourself. Ask the stroke rehab team for help. Ask friends and family members to help. Eat well, get enough rest, and take time to do things that you enjoy. Keep up with your own doctor visits, and make sure to take your medicines regularly. Join a local support group. Find out if you qualify for home health care visits to help with rehab or for adult day care. When should you call for help? Call 911 anytime you think you may need emergency care. For example, call if:    · You have signs of another stroke.  These may include:  ? Sudden numbness, tingling, weakness, or loss of movement in your face, arm, or leg, especially on only one side of your body. ? Sudden vision changes. ? Sudden trouble speaking. ? Sudden confusion or trouble understanding simple statements. ? Sudden problems with walking or balance. ? A sudden, severe headache that is different from past headaches. ? Fainting. ? A seizure. Call 911 even if these symptoms go away in a few minutes. Call your doctor now or seek immediate medical care if:    · You have new symptoms that may be related to your stroke, such as falls or trouble swallowing. Watch and call if:    · You have been feeling sad, depressed, or hopeless, or you have lost interest in things that you usually enjoy.     · You have anxiety or fear that affects your life. Watch closely for changes in your health, and be sure to contact your doctor if you have any problems. Where can you learn more? Go to http://www.gray.com/  Enter C294 in the search box to learn more about \"Stroke: Care Instructions. \"  Current as of: July 6, 2021               Content Version: 13.2  © 7212-2505 [employer]. Care instructions adapted under license by [employer] (which disclaims liability or warranty for this information). If you have questions about a medical condition or this instruction, always ask your healthcare professional. [place] [address] any warranty or liability for your use of this information.

## 2022-03-29 NOTE — PROGRESS NOTES
D/C PLan: Discharge home with physician follow up    Patient prefers to maker her own follow up appointments. CM notes PT has no recommendation for discharge, patient ambulated independently and is independent at home. CM note vascular plans to follow patient as outpatient, patient will have surveillance carotid duplex study outpatient. No CM needs identified. Care Management Interventions  PCP Verified by CM: Yes (Via chart review: Dr. Sari Vazquez )  Palliative Care Criteria Met (RRAT>21 & CHF Dx)?: No  Mode of Transport at Discharge: Other (see comment) (Family. TBD)  Transition of Care Consult (CM Consult):  Other (Home vs home w/ HH. )  Discharge Durable Medical Equipment:  (TBD)  Health Maintenance Reviewed: Yes  Physical Therapy Consult: Yes  Occupational Therapy Consult: Yes  Speech Therapy Consult: Yes  Support Systems: Child(cristhian)  Confirm Follow Up Transport: Family  The Plan for Transition of Care is Related to the Following Treatment Goals : home vs home w/ [facility]  Discharge Location  Patient Expects to be Discharged to[de-identified] Home with family assistance

## 2022-03-29 NOTE — DISCHARGE SUMMARY
Discharge Summary    Patient: Reji Mccoy MRN: [id number]  CSN: [id number]    YOB: 1960  Age: 59 y.o. Sex: female    DOA: 3/26/2022 LOS:  LOS: 3 days   Discharge Date:      Primary Care Provider:  Kanwal Quan MD    Admission Diagnoses: Stroke [facility]) [I63.9]    Discharge Diagnoses:    Problem List as of 3/29/2022 Date Reviewed: 3/26/2022          Codes Class Noted - Resolved    * (Principal) Stroke [facility]) ICD-10-CM: I63.9  ICD-9-CM: 434.91  3/26/2022 - Present        BMI 32.0-32.9,adult ICD-10-CM: E89.71  ICD-9-CM: V85.32  3/26/2022 - Present        Prediabetes ICD-10-CM: R73.03  ICD-9-CM: 790.29  3/26/2022 - Present        HTN (hypertension) ICD-10-CM: I10  ICD-9-CM: 401.9  3/26/2022 - Present              Discharge Medications:     Current Discharge Medication List      START taking these medications    Details   aspirin 81 mg chewable tablet Take 1 Tablet by mouth daily. Qty: 30 Tablet, Refills: 5  Start date: 3/30/2022      atorvastatin (LIPITOR) 80 mg tablet Take 1 Tablet by mouth nightly. Qty: 30 Tablet, Refills: 2  Start date: 3/29/2022             Discharge Condition: Good    Procedures : None    Consults: Neurology, Pulmonary/Critical Care and Vascular Surgery      PHYSICAL EXAM   Visit Vitals  BP (!) 155/93   Pulse 69   Temp 98.3 °F (36.8 °C)   Resp 19   Ht 5' 4\" (1.626 m)   Wt 86.2 kg (190 lb)   SpO2 98%   Breastfeeding No   BMI 32.61 kg/m²     General: Awake, cooperative, no acute distress    HEENT: NC, Atraumatic. PERRLA, EOMI. Anicteric sclerae. Lungs:  CTA Bilaterally. No Wheezing/Rhonchi/Rales. Heart:  Regular  rhythm,  No murmur, No Rubs, No Gallops  Abdomen: Soft, Non distended, Non tender. +Bowel sounds,   Extremities: No c/c/e  Psych:   Not anxious or agitated. Neurological Exam:   Mental Status:  Alert , co-operative , memory intact . Cranial Nerves:  Intact visual fields. PERRL, EOM's full, no nystagmus, no ptosis. Facial sensation numbness on left.  Left facial droop. Palate is midline. Normal sternocleidomastoid strength. Tongue is midline. Hearing is intact bilaterally. Motor:  5/5 strength in upper and lower proximal and distal muscles. Normal bulk and tone. However weak on left compare to right. Reflexes:  Deep tendon reflexes 2+/4 and symmetrical.    Sensory:  Normal and symmetric bilaterally. Gait:  Not tested.          Cerebellar:  No cerebellar signs present.                                         Admission HPI :   Lionel Vela is a 59 y.o. female with hypertension, prediabetes, and obesity presents to the ED with stroke-like symptoms beginning tonight at 1700. 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 She was brushing her teeth and noted that the toothpaste was drooling out the corner of her mouth. She also had onset of left arm weakness, slurred speech, facial numbness, dizziness and blurred vision in the left eye at the same time. She did not have any impairment in coordination or gait. She drove herself to work tonight and immediately was triaged and placed in a bed for stroke evaluation. She was given TPA at the recommendation of teleneurology and her symptoms have slightly improved. She had some spots in her vision and a decrease in her maximal heart rate while exercising yesterday. She typically exercises every day and has not had any symptoms prior to this. She decided not to work out today and just slept in preparation for her shift. She does not regularly take an aspirin or any anticoagulation. She denies any headache, fever, cough, shortness of breath, chest pain, nausea, vomiting, diarrhea, or leg swelling.       Hospital Course :   Ms. Jp Corona was initially admitted to ICU post tPA. She was monitored closely with neuro checks, follow up CT with no acute findings or hemorrhage. She was moved to monitored floor. Most of her neurologic symptoms resolved. Acute CVA -   MRI showed Small embolic acute/subacute right perisylvian MCA territory infarct. CTA head and neck with no significant stenosis. Right ICA Calcified atherosclerotic plaque at the bulb results in approximately 50% stenosis. Seen by vascular, no intervention recommended. S/p tPA. 24 hours post tPA started on aspirin. Continued on statin. Her BP stable. Recommend follow up with PCP and monitor BP, if elevated start on BP meds. Activity: Activity as tolerated    Diet: Regular Diet    Follow-up: PCP, neurology    Disposition: home with home health. Minutes spent on discharge: 45       Labs: Results:       Chemistry Recent Labs     03/29/22 0445 03/28/22 0421 03/27/22  0526   GLU 99 116* 113*    139 140   K 4.1 4.2 3.8    107 106   CO2 29 28 31   BUN 21* 16 18   CREA 0.90 1.00 1.14   CA 8.9 8.9 8.9   AGAP 4 4 3   BUCR 23* 16 16   AP 81 82 83   TP 6.5 6.8 6.8   ALB 3.2* 3.1* 3.1*   GLOB 3.3 3.7 3.7   AGRAT 1.0 0.8 0.8      CBC w/Diff Recent Labs     03/29/22 0445 03/28/22  0421 03/27/22  0526 03/26/22  1825 03/26/22  1825   WBC 5.7 5.6 5.9   < > 8.2   RBC 4.55 4.33 4.44   < > 5.37*   HGB 11.4* 10.9* 11.2*   < > 13.4   HCT 37.1 35.0 36.7   < > 43.6    244 244   < > 288   GRANS  --   --   --   --  51   LYMPH  --   --   --   --  41   EOS  --   --   --   --  1    < > = values in this interval not displayed. Cardiac Enzymes No results for input(s): CPK, CKND1, GARY in the last 72 hours. No lab exists for component: CKRMB, TROIP   Coagulation No results for input(s): PTP, INR, APTT, INREXT in the last 72 hours.     Lipid Panel Lab Results   Component Value Date/Time    Cholesterol, total 229 (H) 03/27/2022 05:26 AM    HDL Cholesterol 32 (L) 03/27/2022 05:26 AM    LDL, calculated 141.8 (H) 03/27/2022 05:26 AM    VLDL, calculated 55.2 03/27/2022 05:26 AM    Triglyceride 276 (H) 03/27/2022 05:26 AM    CHOL/HDL Ratio 7.2 (H) 03/27/2022 05:26 AM      BNP No results for input(s): BNPP in the last 72 hours. Liver Enzymes Recent Labs     03/29/22  0445   TP 6.5   ALB 3.2*   AP 81      Thyroid Studies No results found for: T4, T3U, TSH, TSHEXT         Significant Diagnostic Studies: MRI BRAIN WO CONT    Result Date: 3/27/2022  EXAM: MRI of the brain without intravenous contrast. INDICATION:  \"stroke. \" ADDITIONAL HISTORY:  Facial droop. COMPARISON:  No prior MRI is available for direct comparison. CORRELATION (related prior exam):  Recent CT. PROTOCOL:  Routine brain. _______________ FINDINGS:       IMAGE QUALITY:  The images are overall mildly degraded by motion artifact. BRAIN AND EXTRA-AXIAL SPACE:           ACUTE/SUBACUTE INFARCT:  Small patchy foci in the right MCA territory involving the right subcentral gyrus and right external capsule posteriorly on axial DWI series 4 images 18-20. These are embolic. MASS:  None. HEMORRHAGE:  None. SUBDURAL FLUID COLLECTION:  None. HYDROCEPHALUS:  None. ICA AND DOMINANT VA T2 FLOW VOIDS:  Tortuous. REMOTE CEREBRAL TERRITORIAL INFARCT:  None definite. REMOTE CEREBELLAR INFARCT:  None definite. STRIVE (STandards for Reporting Vascular changes on nEuroimaging):                            --[place] of white matter hyperintensity (\"leukoaraiosis\") of presumed vascular origin:  Low-grade, prominently localized about the left lentiform nucleus. --[place] of chronic lacunes of presumed vascular origin:  None by 3 mm STRIVE criteria. --[place] of perivascular spaces: Moderate. --[place] of \"microbleeds\":   None definite. --Degree of brain atrophy:  None significant. SELLA/PITUITARY:  Unremarkable. HEENT:            ORBITS:  Unremarkable. PARANASAL SINUSES:  Low-grade left maxillary sinus mucosal thickening.            MASTOID AIR CELLS: Predominantly clear. INCLUDED UPPER CERVICAL LYMPH NODES:  Unremarkable. INCLUDED UPPER PAROTIDS:  Unremarkable. NASOPHARYNX:  Unremarkable. BONE MARROW SIGNAL:  Unremarkable. PARTIALLY IMAGED UPPER CERVICAL SPINE:  The degenerative facet arthropathy. SUPERFICIAL SOFT TISSUES: Unremarkable. _______________     Small embolic acute/subacute right perisylvian MCA territory infarct. _______________     CT HEAD WO CONT    Result Date: 3/27/2022  EXAM: CT HEAD WO CONT CLINICAL INDICATION/HISTORY: stroke -Additional: None COMPARISON: MRI brain and CT head from same day TECHNIQUE: Axial CT imaging of the head was performed without intravenous contrast. One or more dose reduction techniques were used on this CT: automated exposure control, adjustment of the mAs and/or kVp according to patient size, and iterative reconstruction techniques. The specific techniques used on this CT exam have been documented in the patient's electronic medical record. Digital Imaging and Communications in Medicine (DICOM) format image data are available to nonaffiliated external healthcare facilities or entities on a secure, media free, reciprocally searchable basis with patient authorization for at least a 12-month period after this study. _______________ FINDINGS: BRAIN:   > Brain volume: Age appropriate.   > White matter: Little or no white matter disease. > Infarcts, encephalomalacia: Small right posterior frontal deep point matter subtle lucency, sight of prior same day's infarct on MRI. Stable anterior left frontal white matter and insular chronic lucency. > Parenchymal mass: None.   > Parenchymal hemorrhage: None.   > Midline shift: None.   > Miscellaneous: None. EXTRA-AXIAL SPACES: Unremarkable. No fluid collections. CALVARIUM: Intact. SINUSES, MASTOIDS: Clear. OTHER EXTRACRANIAL: Unremarkable. _______________     1. No acute intra-axial hemorrhage.  2. Small developing lucency in the posterior right frontal lobe deep white matter, expected maturational changes of known infarct on preceding same day's MRI. CT HEAD WO CONT    Result Date: 3/26/2022  EXAM: CT HEAD WO CONT CLINICAL INDICATION/HISTORY: code s -Additional: CODE S COMPARISON: None TECHNIQUE: Axial CT imaging of the head was performed without intravenous contrast. One or more dose reduction techniques were used on this CT: automated exposure control, adjustment of the mAs and/or kVp according to patient size, and iterative reconstruction techniques. The specific techniques used on this CT exam have been documented in the patient's electronic medical record. Digital Imaging and Communications in Medicine (DICOM) format image data are available to nonaffiliated external healthcare facilities or entities on a secure, media free, reciprocally searchable basis with patient authorization for at least a 12-month period after this study. _______________ FINDINGS: BRAIN:   > Brain volume: Age appropriate.   > White matter: Little or no white matter disease. > Infarcts, encephalomalacia: Minimal lucency in the Left frontal lobe deep white matter lucency adjacent to the basal ganglia and superior insular white matter. > Parenchymal mass: None.   > Parenchymal hemorrhage: None.   > Midline shift: None.   > Miscellaneous: None. EXTRA-AXIAL SPACES: Unremarkable. No fluid collections. CALVARIUM: Intact. SINUSES, MASTOIDS: Clear. OTHER EXTRACRANIAL: Unremarkable. _______________     1. Age-indeterminate left frontal lobe deep white matter lucency, without mass effect. Differential considerations would include chronic small vessel ischemic changes versus white matter infarct. 2. No acute intra-axial hemorrhage. Code S report directly communicated to Dilma Reese on 3/26/2022 6:55 PM. Results understood and acknowledged.       CTA HEAD NECK W WO CONT    Addendum Date: 3/27/2022    Addendum: There is a tiny calcified embolus in a right perisylvian MCA M2/M3 branch vessel. Result Date: 3/27/2022  EXAM: CT arteriogram of the head and neck. INDICATION:  \"code s. \" ADDITIONAL HISTORY:  Facial droop. COMPARISON:  No prior CTA or MRA available for comparison. TECHNIQUE:  Three-dimensional surface renderings, maximum intensity projection reformations, and curved planar reformations were post-processed at a dedicated outside facility [employer]). Percent stenoses are reported with reference to the downstream lumen (\"NASCET\" method). DOSE REDUCTION AND DICOM INFORMATION: One or more dose reduction techniques were used on this CT: automated exposure control, adjustment of the mAs and/or kVp according to patient size, and iterative reconstruction techniques. The specific techniques used on this CT exam have been documented in the patient's electronic medical record. Digital Imaging and Communications in Medicine (DICOM) format image data are available to nonaffiliated external healthcare facilities or entities on a secure, media free, reciprocally searchable basis with patient authorization for at least a 12-month period after this study. _______________ FINDINGS:      ARTERIOGRAPHIC BOLUS QUALITY:  Diagnostic. GENERALIZED HEAD AND NECK ATHEROSCLEROTIC BURDEN:  Moderate. VASCULAR TORTUOSITY:  Marked. AORTIC ARCH VESSELS:  Conventional 3 vessel anatomy. Brachiocephalic:  No significant stenosis. RSubClav:  No significant proximal stenosis. LSubClav:  No significant proximal stenosis. RIGHT CAROTID ARTERY:           CCA:  Widely patent. ICA (extracranial):  Calcified atherosclerotic plaque at the bulb results in approximately 50% stenosis. Intracranial ICA:  Widely patent. LEFT CAROTID ARTERY:              CCA:  Widely patent. ICA (extracranial): Widely patent. Atherosclerotic plaque at the ICA bulb results in 0% extracranial ICA stenosis.            Intracranial ICA:  Widely patent. VERTEBRAL ARTERIES, PICAs, SCAs:           VA dominance:  Markedly right. Right VA:  Widely patent. Left VA:  Origin stenosis, partially obtained by local streak artifact, potentially high-grade. Otherwise unremarkable. PICAs:  Opacified bilaterally. SCAs:  Opacified bilaterally. BASILAR AND CEREBRAL ARTERIES:            3D image quality:  Diagnostic. ANEURYSMS: No saccular aneurysm is detected. RMCA:  Unremarkable. RACA:   Unremarkable. LMCA:   Unremarkable. LACA:   Unremarkable. Basilar artery:   Unremarkable. RPCA:   Unremarkable. LPCA:   Unremarkable. COW anatomy summary:                 RACA A1:  Intermediate caliber. LACA A1:  Intermediate caliber. aComm: Hypoplastic. RpComm:  Hypoplastic/absent. LpComm:  Hypoplastic/absent. RPCA P1:  Intermediate caliber. LPCA P1:  Intermediate caliber. DURAL VENOUS SINUSES: Incidentally opacified and no evidence of acute thrombosis is detected.  _______________        ACUTE FINDINGS: None. CTA NECK:            HUBER:  Approximately 50% bulb stenosis. LICA:  0% stenosis. RVA:  Dominant, widely patent. LVA:  Origin stenosis, partially obscured, suspected to be high-grade. CTA BRAIN: Negative. _______________ Note: This final report is without significant discrepancy relative to the preliminary report. XR CHEST PORT    Result Date: 3/27/2022  --------------------------------------------------------------------------- <<<<<<<<<           [facility]           >>>>>>>>> --------------------------------------------------------------------------- CLINICAL HISTORY:  Cerebrovascular accident.  COMPARISON EXAMINATIONS:  None. ---  SINGLE FRONTAL VIEW OF THE CHEST  --- The lungs and pleural spaces are clear. The mediastinum is unremarkable in appearance. No significant osseous abnormalities are identified.  --------------    -------------- No evidence for active cardiopulmonary disease. ECHO ADULT COMPLETE    Result Date: 3/28/2022    Left Ventricle: Left ventricle size is normal. Increased wall thickness. Normal wall motion. Normal left ventricular systolic function with a visually estimated EF of 60 - 65%. Grade I diastolic dysfunction with normal LAP.   Mitral Valve: Mildly calcified leaflet. Mild transvalvular regurgitation.   Pulmonary artery : Estimated pulmonary arterial systolic pressure is 36 mmhg.   Interatrial Septum : Saline contrast was given to evaluate for intracardiac shunt. No shunt seen. Bubble study was negative. DUPLEX CAROTID BILATERAL    Result Date: 3/29/2022  Bilateral less than 50% stenosis of the internal carotid arteries. No significant stenosis in the external carotid arteries bilaterally. Antegrade flow in both vertebral arteries. Normal flow in both subclavian arteries. No results found for this or any previous visit. Please note that this dictation was completed with [employer], the [employer] voice recognition software. Quite often unanticipated grammatical, syntax, homophones, and other interpretive errors are inadvertently transcribed by the computer software. Please disregard these errors. Please excuse any errors that have escaped final proofreading.      CC: Dhara Emanuel MD

## 2022-03-29 NOTE — PROGRESS NOTES
Problem: Dysphagia (Adult)  Description: Patient will:  1. Tolerate PO trials with 0 s/s overt distress in 4/5 trials  2. Utilize compensatory swallow strategies/maneuvers (decrease bite/sip, size/rate, alt. liq/sol) with min cues in 4/5 trials  3. Perform oral-motor/laryngeal exercises to increase oropharyngeal swallow function with min cues  4. Complete an objective swallow study (i.e., MBSS) to assess swallow integrity, r/o aspiration, and determine of safest LRD, min A    Rec:     Easy to Chew with thin liquids (use chin tuck)  Aspiration precautions  HOB >45 during po intake, remain >30 for 30-45 minutes after po   Small bites/sips; alternate liquid/solid with slow feeding rate   Oral care TID  Meds as tolerated  Outcome: Progressing Towards Goal     SPEECH LANGUAGE PATHOLOGY DYSPHAGIA TREATMENT    Patient: Alma River (13 y.o. female)  Date: 3/29/2022  Diagnosis: Stroke [facility]) [I63.9] Stroke [facility])       Precautions: Aspiration, Fall  PLOF: Regular diet    ASSESSMENT:  Patient seen by  for dysphagia management. Daughter present in room during treatment. Patient reports no episodes of coughing/choking on food or liquids with meals since last [address] session. PO trials of thin liquids administered using chin tuck strategy. Patient tolerated PO trials x 10 of thins with no s/sx of aspiration or distress. Patient successfully utilized chin tuck with thin liquids and reported that swallowing is \"much easier. \"  Recommend upgrade to thin liquids utilizing chin tuck strategy and aspiration precautions. Will d/w nurse and Dr. Vitale Led. Progression toward goals:  [x]         Improving appropriately and progressing toward goals  []         Improving slowly and progressing toward goals  []         Not making progress toward goals and plan of care will be adjusted     PLAN:  Recommendations and Planned Interventions:  See above  Patient continues to benefit from skilled intervention to address the above impairments.  Continue treatment per established plan of care. Discharge Recommendations: To Be Determined     SUBJECTIVE:   Patient stated grape juice is my favorite. OBJECTIVE:   Cognitive and Communication Status:  Neurologic State: Alert,Appropriate for age  Orientation Level: Oriented X4  Cognition: Appropriate decision making  Perception: Appears intact  Perseveration: No perseveration noted  Safety/Judgement: Fall prevention  Dysphagia Treatment:  Oral Assessment:  Oral Assessment  Labial: Left droop  Dentition: Intact  Oral Hygiene: adequate  Lingual: Decreased rate  Velum: Unable to visualize  Mandible: No impairment  Gag Reflex: No impairment  P.O. Trials:   Patient Position:  (upright in bed)   Vocal quality prior to P.O.: No impairment   Consistency Presented:  Thin liquid   How Presented: Self-fed/presented,Cup/sip,Straw,Successive swallows       Bolus Acceptance: No impairment   Bolus Formation/Control: Impaired   Type of Impairment: Other (comment) (suspect premature spillage)   Propulsion: No impairment   Oral Residue: None   Initiation of Swallow: Delayed (# of seconds)   Laryngeal Elevation: Functional   Aspiration Signs/Symptoms: None   Pharyngeal Phase Characteristics: No impairment, issues, or problems    Effective Modifications: Chin tuck,Small sips and bites   Cues for Modifications: Minimal         Oral Phase Severity: Mild   Pharyngeal Phase Severity : Mild     PAIN:  Pain level pre-treatment: 0/10   Pain level post-treatment: 0/10       After treatment:   []              Patient left in no apparent distress sitting up in chair  [x]              Patient left in no apparent distress in bed  []              Call bell left within reach  []              Nursing notified  [x]              Family present  []              Caregiver present  []              Bed alarm activated      COMMUNICATION/EDUCATION:   [x] Aspiration precautions; swallow safety; compensatory techniques  []        Patient unable to participate in education; education ongoing with staff  []  Posted safety precautions in patient's room.   [] Oral-motor/laryngeal strengthening exercises      Theta EZEKIEL Gregg  Time Calculation: 20 mins (10 minutes dysphagia TX; 10 minutes ST)

## 2022-03-29 NOTE — PROGRESS NOTES
NEUROLOGY PROGRESS NOTE        Patient: Mariama Hahn        Sex: female          DOA: 3/26/2022  YOB: 1960      Age:  59 y.o.         LOS: 3 days     Identification:  59 y.o. RH female with history of HTN, who presented with Left hand weakness, slurred speech, s/p tPA.          SUBJECTIVE:   The patient is doing much better. She denies any new numbness or weakness. Speech is much better. Stroke Work-up:  Brain MRI: Small embolic acute/subacute right perisylvian MCA territory infarct. Repeat CT Head: 1. No acute intra-axial hemorrhage. 2. Small developing lucency in the posterior right frontal lobe deep white matter, expected maturational changes of known infarct on preceding same day's MRI. CTA NECK: HUBER:  Approximately 50% bulb stenosis. LICA:  0% stenosis. RVA:  Dominant, widely patent. LVA:  Origin stenosis, partially obscured, suspected to be high-grade. CTA BRAIN: Negative. Echocardiogram:     Left Ventricle: Left ventricle size is normal. Increased wall thickness. Normal wall motion. Normal left ventricular systolic function with a visually estimated EF of 60 - 65%. Grade I diastolic dysfunction with normal LAP.   Mitral Valve: Mildly calcified leaflet. Mild transvalvular regurgitation.   Pulmonary artery : Estimated pulmonary arterial systolic pressure is 36 mmhg.   Interatrial Septum : Saline contrast was given to evaluate for intracardiac shunt. No shunt seen. Bubble study was negative. Carotid Doppler ultrasonography: Bilateral less than 50% stenosis of the internal carotid arteries. No significant stenosis in the external carotid arteries bilaterally. Antegrade flow in both vertebral arteries. Normal flow in both subclavian arteries.   Lipid panel:         Lab Results   Component Value Date/Time     Cholesterol, total 229 (H) 03/27/2022 05:26 AM     HDL Cholesterol 32 (L) 03/27/2022 05:26 AM     LDL, calculated 141.8 (H) 03/27/2022 05:26 AM     VLDL, calculated 55.2 03/27/2022 05:26 AM     Triglyceride 276 (H) 03/27/2022 05:26 AM     CHOL/HDL Ratio 7.2 (H) 03/27/2022 05:26 AM      HbA1c:         Lab Results   Component Value Date/Time     Hemoglobin A1c 6.0 (H) 03/27/2022 05:26 AM     REVIEW OF SYSTEMS: Denies chest pain, abdominal pain, nausea or vomiting. No fever or chills. OBJECTIVE:      Visit Vitals  BP (!) 131/58 (BP 1 Location: Right upper arm)   Pulse 68   Temp 97.8 °F (36.6 °C)   Resp 18   Ht 5' 4\" (1.626 m)   Wt 86.2 kg (190 lb)   SpO2 97%   Breastfeeding No   BMI 32.61 kg/m²     Physical Exam:  GEN: Alert, NAD  EYES: conjunctiva normal, lids with out lesions  HEENT: MMM. HEART: RRR +S1 +S2  LUNGS: CTA B/L no rales or rhonchi. ABDOMEN: Soft, non-tender. EXTREMITIES: No edema cyanosis  SKIN: no rashes or skin breakdown, no nodules  NEURO: Alert, oriented x3. Speech is slightly dysarthric. Cranials: Slight left facial droop with smile. EOMI, VFF. Tongue is midline. Motor: Subtle pronator drift on the left arm. Sensory: Intact to crude touch on all four.  Coordination: Intact with no dysmetria during FNF.      Current Facility-Administered Medications   Medication Dose Route Frequency    famotidine (PEPCID) tablet 20 mg  20 mg Oral BID    aspirin chewable tablet 81 mg  81 mg Oral DAILY    insulin lispro (HUMALOG) injection   SubCUTAneous AC&HS    sodium chloride (NS) flush 5-40 mL  5-40 mL IntraVENous Q8H    sodium chloride (NS) flush 5-40 mL  5-40 mL IntraVENous PRN    ondansetron (ZOFRAN) injection 4 mg  4 mg IntraVENous Q6H PRN    atorvastatin (LIPITOR) tablet 80 mg  80 mg Oral QHS    acetaminophen (TYLENOL) tablet 650 mg  650 mg Oral Q6H PRN    Or    acetaminophen (TYLENOL) suppository 650 mg  650 mg Rectal Q6H PRN    polyethylene glycol (MIRALAX) packet 17 g  17 g Oral DAILY PRN    glucose chewable tablet 16 g  16 g Oral PRN    glucagon (GLUCAGEN) injection 1 mg  1 mg IntraMUSCular PRN    dextrose 10% infusion 0-250 mL  0-250 mL IntraVENous PRN Laboratory  Recent Results (from the past 24 hour(s))   GLUCOSE, POC    Collection Time: 03/28/22  5:09 PM   Result Value Ref Range    Glucose (POC) 94 70 - 414 mg/dL   METABOLIC PANEL, COMPREHENSIVE    Collection Time: 03/29/22  4:45 AM   Result Value Ref Range    Sodium 142 136 - 145 mmol/L    Potassium 4.1 3.5 - 5.5 mmol/L    Chloride 109 100 - 111 mmol/L    CO2 29 21 - 32 mmol/L    Anion gap 4 3.0 - 18 mmol/L    Glucose 99 74 - 99 mg/dL    BUN 21 (H) 7.0 - 18 MG/DL    Creatinine 0.90 0.6 - 1.3 MG/DL    BUN/Creatinine ratio 23 (H) 12 - 20      GFR est AA >60 >60 ml/min/1.73m2    GFR est non-AA >60 >60 ml/min/1.73m2    Calcium 8.9 8.5 - 10.1 MG/DL    Bilirubin, total 0.4 0.2 - 1.0 MG/DL    ALT (SGPT) 21 13 - 56 U/L    AST (SGOT) 13 10 - 38 U/L    Alk. phosphatase 81 45 - 117 U/L    Protein, total 6.5 6.4 - 8.2 g/dL    Albumin 3.2 (L) 3.4 - 5.0 g/dL    Globulin 3.3 2.0 - 4.0 g/dL    A-G Ratio 1.0 0.8 - 1.7     CBC W/O DIFF    Collection Time: 03/29/22  4:45 AM   Result Value Ref Range    WBC 5.7 4.6 - 13.2 K/uL    RBC 4.55 4.20 - 5.30 M/uL    HGB 11.4 (L) 12.0 - 16.0 g/dL    HCT 37.1 35.0 - 45.0 %    MCV 81.5 78.0 - 100.0 FL    MCH 25.1 24.0 - 34.0 PG    MCHC 30.7 (L) 31.0 - 37.0 g/dL    RDW 14.4 11.6 - 14.5 %    PLATELET 944 817 - 217 K/uL    MPV 10.1 9.2 - 11.8 FL    NRBC 0.0 0  WBC    ABSOLUTE NRBC 0.00 0.00 - 0.01 K/uL   GLUCOSE, POC    Collection Time: 03/29/22  6:27 AM   Result Value Ref Range    Glucose (POC) 111 (H) 70 - 110 mg/dL   GLUCOSE, POC    Collection Time: 03/29/22 11:53 AM   Result Value Ref Range    Glucose (POC) 138 (H) 70 - 110 mg/dL       Radiology:  MRI BRAIN WO CONT    Result Date: 3/27/2022  EXAM: MRI of the brain without intravenous contrast. INDICATION:  \"stroke. \" ADDITIONAL HISTORY:  Facial droop. COMPARISON:  No prior MRI is available for direct comparison. CORRELATION (related prior exam):  Recent CT.  PROTOCOL:  Routine brain. _______________ FINDINGS:       IMAGE QUALITY: The images are overall mildly degraded by motion artifact. BRAIN AND EXTRA-AXIAL SPACE:           ACUTE/SUBACUTE INFARCT:  Small patchy foci in the right MCA territory involving the right subcentral gyrus and right external capsule posteriorly on axial DWI series 4 images 18-20. These are embolic. MASS:  None. HEMORRHAGE:  None. SUBDURAL FLUID COLLECTION:  None. HYDROCEPHALUS:  None. ICA AND DOMINANT VA T2 FLOW VOIDS:  Tortuous. REMOTE CEREBRAL TERRITORIAL INFARCT:  None definite. REMOTE CEREBELLAR INFARCT:  None definite. STRIVE (STandards for Reporting Vascular changes on nEuroimaging):                            --[place] of white matter hyperintensity (\"leukoaraiosis\") of presumed vascular origin:  Low-grade, prominently localized about the left lentiform nucleus. --[place] of chronic lacunes of presumed vascular origin:  None by 3 mm STRIVE criteria. --[place] of perivascular spaces: Moderate. --[place] of \"microbleeds\":   None definite. --Degree of brain atrophy:  None significant. SELLA/PITUITARY:  Unremarkable. HEENT:            ORBITS:  Unremarkable. PARANASAL SINUSES:  Low-grade left maxillary sinus mucosal thickening. MASTOID AIR CELLS:  Predominantly clear. INCLUDED UPPER CERVICAL LYMPH NODES:  Unremarkable. INCLUDED UPPER PAROTIDS:  Unremarkable. NASOPHARYNX:  Unremarkable. BONE MARROW SIGNAL:  Unremarkable. PARTIALLY IMAGED UPPER CERVICAL SPINE:  The degenerative facet arthropathy.       SUPERFICIAL SOFT TISSUES: Unremarkable. _______________     Small embolic acute/subacute right perisylvian MCA territory infarct. _______________     CT HEAD WO CONT    Result Date: 3/27/2022  EXAM: CT HEAD WO CONT CLINICAL INDICATION/HISTORY: stroke -Additional: None COMPARISON: MRI brain and CT head from same day TECHNIQUE: Axial CT imaging of the head was performed without intravenous contrast. One or more dose reduction techniques were used on this CT: automated exposure control, adjustment of the mAs and/or kVp according to patient size, and iterative reconstruction techniques. The specific techniques used on this CT exam have been documented in the patient's electronic medical record. Digital Imaging and Communications in Medicine (DICOM) format image data are available to nonaffiliated external healthcare facilities or entities on a secure, media free, reciprocally searchable basis with patient authorization for at least a 12-month period after this study. _______________ FINDINGS: BRAIN:   > Brain volume: Age appropriate.   > White matter: Little or no white matter disease. > Infarcts, encephalomalacia: Small right posterior frontal deep point matter subtle lucency, sight of prior same day's infarct on MRI. Stable anterior left frontal white matter and insular chronic lucency. > Parenchymal mass: None.   > Parenchymal hemorrhage: None.   > Midline shift: None.   > Miscellaneous: None. EXTRA-AXIAL SPACES: Unremarkable. No fluid collections. CALVARIUM: Intact. SINUSES, MASTOIDS: Clear. OTHER EXTRACRANIAL: Unremarkable. _______________     1. No acute intra-axial hemorrhage. 2. Small developing lucency in the posterior right frontal lobe deep white matter, expected maturational changes of known infarct on preceding same day's MRI. CT HEAD WO CONT    Result Date: 3/26/2022  EXAM: CT HEAD WO CONT CLINICAL INDICATION/HISTORY: code s -Additional: CODE S COMPARISON: None TECHNIQUE: Axial CT imaging of the head was performed without intravenous contrast. One or more dose reduction techniques were used on this CT: automated exposure control, adjustment of the mAs and/or kVp according to patient size, and iterative reconstruction techniques.   The specific techniques used on this CT exam have been documented in the patient's electronic medical record. Digital Imaging and Communications in Medicine (DICOM) format image data are available to nonaffiliated external healthcare facilities or entities on a secure, media free, reciprocally searchable basis with patient authorization for at least a 12-month period after this study. _______________ FINDINGS: BRAIN:   > Brain volume: Age appropriate.   > White matter: Little or no white matter disease. > Infarcts, encephalomalacia: Minimal lucency in the Left frontal lobe deep white matter lucency adjacent to the basal ganglia and superior insular white matter. > Parenchymal mass: None.   > Parenchymal hemorrhage: None.   > Midline shift: None.   > Miscellaneous: None. EXTRA-AXIAL SPACES: Unremarkable. No fluid collections. CALVARIUM: Intact. SINUSES, MASTOIDS: Clear. OTHER EXTRACRANIAL: Unremarkable. _______________     1. Age-indeterminate left frontal lobe deep white matter lucency, without mass effect. Differential considerations would include chronic small vessel ischemic changes versus white matter infarct. 2. No acute intra-axial hemorrhage. Code S report directly communicated to Kamila More on 3/26/2022 6:55 PM. Results understood and acknowledged. CTA HEAD NECK W WO CONT    Addendum Date: 3/27/2022    Addendum: There is a tiny calcified embolus in a right perisylvian MCA M2/M3 branch vessel. Result Date: 3/27/2022  EXAM: CT arteriogram of the head and neck. INDICATION:  \"code s. \" ADDITIONAL HISTORY:  Facial droop. COMPARISON:  No prior CTA or MRA available for comparison. TECHNIQUE:  Three-dimensional surface renderings, maximum intensity projection reformations, and curved planar reformations were post-processed at a dedicated outside facility [facility]). Percent stenoses are reported with reference to the downstream lumen (\"NASCET\" method).  DOSE REDUCTION AND DICOM INFORMATION: One or more dose reduction techniques were used on this CT: automated exposure control, adjustment of the mAs and/or kVp according to patient size, and iterative reconstruction techniques. The specific techniques used on this CT exam have been documented in the patient's electronic medical record. Digital Imaging and Communications in Medicine (DICOM) format image data are available to nonaffiliated external healthcare facilities or entities on a secure, media free, reciprocally searchable basis with patient authorization for at least a 12-month period after this study. _______________ FINDINGS:      ARTERIOGRAPHIC BOLUS QUALITY:  Diagnostic. GENERALIZED HEAD AND NECK ATHEROSCLEROTIC BURDEN:  Moderate. VASCULAR TORTUOSITY:  Marked. AORTIC ARCH VESSELS:  Conventional 3 vessel anatomy. Brachiocephalic:  No significant stenosis. RSubClav:  No significant proximal stenosis. LSubClav:  No significant proximal stenosis. RIGHT CAROTID ARTERY:           CCA:  Widely patent. ICA (extracranial):  Calcified atherosclerotic plaque at the bulb results in approximately 50% stenosis. Intracranial ICA:  Widely patent. LEFT CAROTID ARTERY:              CCA:  Widely patent. ICA (extracranial): Widely patent. Atherosclerotic plaque at the ICA bulb results in 0% extracranial ICA stenosis. Intracranial ICA:  Widely patent. VERTEBRAL ARTERIES, PICAs, SCAs:           VA dominance:  Markedly right. Right VA:  Widely patent. Left VA:  Origin stenosis, partially obtained by local streak artifact, potentially high-grade. Otherwise unremarkable. PICAs:  Opacified bilaterally. SCAs:  Opacified bilaterally. BASILAR AND CEREBRAL ARTERIES:            3D image quality:  Diagnostic. ANEURYSMS: No saccular aneurysm is detected. RMCA:  Unremarkable. RACA:   Unremarkable. LMCA:   Unremarkable. LACA:   Unremarkable. Basilar artery:   Unremarkable. RPCA:   Unremarkable. LPCA:   Unremarkable. COW anatomy summary:                 RACA A1:  Intermediate caliber. LACA A1:  Intermediate caliber. aComm: Hypoplastic. RpComm:  Hypoplastic/absent. LpComm:  Hypoplastic/absent. RPCA P1:  Intermediate caliber. LPCA P1:  Intermediate caliber. DURAL VENOUS SINUSES: Incidentally opacified and no evidence of acute thrombosis is detected.  _______________        ACUTE FINDINGS: None. CTA NECK:            HUBER:  Approximately 50% bulb stenosis. LICA:  0% stenosis. RVA:  Dominant, widely patent. LVA:  Origin stenosis, partially obscured, suspected to be high-grade. CTA BRAIN: Negative. _______________ Note: This final report is without significant discrepancy relative to the preliminary report. XR CHEST PORT    Result Date: 3/27/2022  --------------------------------------------------------------------------- <<<<<<<<<           [facility]  [facility]           >>>>>>>>> --------------------------------------------------------------------------- CLINICAL HISTORY:  Cerebrovascular accident. COMPARISON EXAMINATIONS:  None. ---  SINGLE FRONTAL VIEW OF THE CHEST  --- The lungs and pleural spaces are clear. The mediastinum is unremarkable in appearance. No significant osseous abnormalities are identified.  --------------    -------------- No evidence for active cardiopulmonary disease. ECHO ADULT COMPLETE    Result Date: 3/28/2022    Left Ventricle: Left ventricle size is normal. Increased wall thickness. Normal wall motion. Normal left ventricular systolic function with a visually estimated EF of 60 - 65%.  Grade I diastolic dysfunction with normal LAP.   Mitral Valve: Mildly calcified leaflet. Mild transvalvular regurgitation.   Pulmonary artery : Estimated pulmonary arterial systolic pressure is 36 mmhg.   Interatrial Septum : Saline contrast was given to evaluate for intracardiac shunt. No shunt seen. Bubble study was negative. DUPLEX CAROTID BILATERAL    Result Date: 3/29/2022  Bilateral less than 50% stenosis of the internal carotid arteries. No significant stenosis in the external carotid arteries bilaterally. Antegrade flow in both vertebral arteries. Normal flow in both subclavian arteries. ASSESSMENT/IMPRESSION:   Right MCA infarct, status post tPA. The patient needs to be on aspirin 81 mg and atorvastatin 80 mg daily. The infarct seems to be secondary to artery to artery emboli. We are going to follow the patient as outpatient regularly.     PLAN/RECOMMENDATIONS:  1. Continue aspirin 81 mg and atorvastatin 80 mg daily  2. Normotensive protocol  3. Asked the patient to record her blood pressure regularly at home. If the blood pressure is more than 140/90, she can be restarted on her home antihypertensive regimen. 4.  PT/OT and SLP. Neurology signs off at this time. We are going to follow the patient as outpatient. Please do not hesitate to return with any questions.     Signed:  Desirae Wheeler MD  3/29/2022  2:44 PM

## 2022-03-29 NOTE — PROGRESS NOTES
Problem: Aspiration - Risk of  Goal: *Absence of aspiration  Outcome: Progressing Towards Goal     Problem: Patient Education: Go to Patient Education Activity  Goal: Patient/Family Education  Outcome: Progressing Towards Goal     Problem: Falls - Risk of  Goal: *Absence of Falls  Description: Document Sheridan Perry Fall Risk and appropriate interventions in the flowsheet.   Outcome: Progressing Towards Goal  Note: Fall Risk Interventions:  Mobility Interventions: Assess mobility with egress test         Medication Interventions: Evaluate medications/consider consulting pharmacy    Elimination Interventions: Call light in reach              Problem: Patient Education: Go to Patient Education Activity  Goal: Patient/Family Education  Outcome: Progressing Towards Goal     Problem: Patient Education: Go to Patient Education Activity  Goal: Patient/Family Education  Outcome: Progressing Towards Goal     Problem: Patient Education: Go to Patient Education Activity  Goal: Patient/Family Education  Outcome: Progressing Towards Goal

## 2022-03-29 NOTE — PROGRESS NOTES
Clinical Pharmacy Note: IV to PO Automatic Conversion    Patient Name: Roscoe Gallo   YOB: 1960   Medical Record Number: [id number]   Date of Admission: 3/26/2022    Daily Progress Note: 3/29/2022 11:32 AM     Please note the following medication(s) (pepcid) has/have been changed from IV to PO based on the following critiera:    Patient is taking scheduled oral medications  Patient is tolerating tube feeds at goal rate or an NPO (except for meds), full liquid, soft or regular diet      Current Diet Orders  Active Orders   Diet    ADULT DIET Easy to Chew; Low Fat/Low Chol/High Fiber/2 gm Na; Mildly Thick (Nectar)        New Order:  pepcid 20 mg po bid    This IV to PO conversion is based on the [facility] P&T approved automatic conversion policy for eligible patients. Please call with questions.     Guerita Matute [facility] [place]  Clinical Pharmacist  [phone]

## 2022-03-29 NOTE — PROGRESS NOTES
Pulm/CC    Patient has been transferred out of the ICU; I would sign off; please call if needed.     Sharlene Glynn MD

## 2022-03-29 NOTE — PROGRESS NOTES
Problem: Motor Speech Impaired (Adult)  Description: 1. Utilize compensatory strategies (decrease rate, overarticulate, increase intensity) to increase intelligibility to >95% at conversational level with min A visual/verbal cues in 4/5 trials. (goal met 3/29/22)  2. Perform oral motor exercises (with and without resistance) in therapy and at home to increase oral motor strength/range-of-motion for articulation tasks with min A with visual/verbal cues in 4/5 trials. 3. Complete articulatory agility tasks, specifically targeting /s/ and /s-blends/ (reading-conversation) with min A with visual/verbal cues in 4/5 trials. (goal met 3/29/22)  Outcome: Progressing Towards Goal      SPEECH-LANGUAGE TREATMENT    Patient: Edouard Mora (9 y.o. female)  Date: 3/29/2022  Primary Diagnosis: Stroke [facility]) [I63.9]        Precautions: Aspiration, Fall  PLOF: ED nurse    ASSESSMENT :  Patient seen for speech therapy treatment targeting reduced speech intelligibility following stroke. Daughter present in room during treatment. Patient educated on clear speech strategies including slow rate and over-articulation. Patient given Grandfather Passage to read using clear speech strategies. Patient read passage using slow rate and over-articulation with 100% intelligibility. ST provided target words list (s-blends) and patient able to produce 8/10 s-blends with no errors. On two missed targets, patient able to produce blend on second trial. Patient continues to struggle with managing excess saliva at rest and during speech. Continue POC. Patient will benefit from skilled intervention to address the above impairments.   Patient's rehabilitation potential is considered to be Good  Factors which may influence rehabilitation potential include:  []              None noted  []              Mental ability/status  [x]              Medical condition  []              Home/family situation and support systems  []              Safety awareness  []              Pain tolerance/management  []              Other:      PLAN :  Recommendations and Planned Interventions:  See above  Frequency/Duration: Patient will be followed by speech-language pathology 3 times a week to address goals. Discharge Recommendations: To Be Determined     SUBJECTIVE:   Patient stated Etienne Burgos had a stutter growing up. OBJECTIVE:     Past Medical History:   Diagnosis Date    BMI 32.0-32.9,adult 3/26/2022    HTN (hypertension) 3/26/2022    Prediabetes 3/26/2022   History reviewed. No pertinent surgical history.   Home Situation:   Home Situation  Home Environment: Private residence  One/Two Story Residence: One story  Living Alone: Yes (will live with daughter until stronger)  Support Systems: Child(cristhian)  Patient Expects to be Discharged to[de-identified] Home with family assistance  Tub or Shower Type: Tub/Shower combination  Mental Status:  Neurologic State: Alert,Appropriate for age  Orientation Level: Oriented X4  Cognition: Appropriate decision making  Perception: Appears intact  Perseveration: No perseveration noted  Safety/Judgement: Fall prevention  Motor Speech:  Oral-Motor Structure/Motor Speech  Dysarthric Characteristics: Imprecise  Intelligibility: No impairment  Word Intelligibility (%):  (100)  Phrase Intelligibility (%):  (100)  Sentence Intelligibility (%):  (100)  Conversation Intelligibility (%):  (100)  Intonation:  (WNL)  Rate:  (slow rate)  Prosody:  (WNL)  Voice:  Vocal Quality: No impairment     PAIN:  Pain level pre-treatment: 0/10   Pain level post-treatment: 0/10     After treatment:   []              Patient left in no apparent distress sitting up in chair  [x]              Patient left in no apparent distress in bed  []              Call bell left within reach  []              Nursing notified  [x]              Caregiver present  []              Bed alarm activated    COMMUNICATION/EDUCATION:   [x] Patient/family have participated as able in goal setting and plan of care. []  Patient/family agree to work toward stated goals and plan of care. []  Patient understands intent and goals of therapy, but is neutral about his/her participation.   []  Patient is unable to participate in goal setting and plan of care; education ongoing with interdisciplinary staff    Thank you for this referral,   Robbi Baez, SLP  Time Calculation: 20 mins (10 minutes dysphagia TX; 10 minutes ST)

## 2022-03-29 NOTE — PROGRESS NOTES
Vascular Surgery    Pt seen and examined. Discussed plan of care with pt and daughter at bedside. R MCA subacute CVA. S/p TPA. Sx resolving. ?embolic in nature. CTA and carotid duplex reviewed. Mild atherosclerotic disease. R ICA <50% stenosis. TTE neg for shunt. PMH s/f obesity, HTN, pre-diabetes. Not previously on asa or statin therapy. Non-smoker. 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. Agree w optimal medical management. Asa 81 mg and full dose statin 80 mg. Goal LDL<70. Minimal atherosclerotic carotid disease, unclear if this is the source of stroke. However, risk/benefit does not support surgical treatment. Will plan to follow as outpatient with surveillance carotid duplex study. Available otherwise as needed.        Lilia Orellana MD, [address] [place] [address] [facility]  [phone]  (O) [phone]

## 2022-03-29 NOTE — PROGRESS NOTES
Problem: Mobility Impaired (Adult and Pediatric)  Goal: *Acute Goals and Plan of Care (Insert Text)  Note:   physical Therapy TREATMENT    Patient: Alma River (86 y.o. female)  Date: 3/29/2022  Diagnosis: Stroke [facility]) [I63.9] Stroke [facility])  Precautions: Fall   Chart, physical therapy assessment, plan of care and goals were reviewed. ASSESSMENT:  Reviewed home safety, activity recommendations, return to exercise plan, and answered all questions from patient and daughter. Pt plans to d/c to daughters home. Pt will discharge papers at bedside. Progression toward goals:  []      Improving appropriately and progressing toward goals  [x]      Improving slowly and progressing toward goals  []      Not making progress toward goals and plan of care will be adjusted     PLAN:  Patient continues to benefit from skilled intervention to address the above impairments. Continue treatment per established plan of care. Discharge Recommendations:  Outpatient  Further Equipment Recommendations for Discharge:  N/A     SUBJECTIVE:   Patient stated I am being discharged.     OBJECTIVE DATA SUMMARY:   Critical Behavior:  Neurologic State: Alert,Appropriate for age  Orientation Level: Oriented X4  Cognition: Appropriate decision making  Safety/Judgement: Fall prevention  Pain:  Pain Scale 1: Numeric (0 - 10)  Pain Intensity 1: 0  Activity Tolerance:   Good  Please refer to the flowsheet for vital signs taken during this treatment.   After treatment:   [] Patient left in no apparent distress sitting up in chair  [x] Patient left in no apparent distress in bed  [x] Call bell left within reach  [] Nursing notified  [] Caregiver present  [] Bed alarm activated      Neisha Carreno   Time Calculation: 23 mins

## 2022-03-30 ENCOUNTER — PATIENT OUTREACH (OUTPATIENT)
Dept: OTHER | Age: 62
End: 2022-03-30

## 2022-03-30 RX ORDER — LOSARTAN POTASSIUM AND HYDROCHLOROTHIAZIDE 12.5; 5 MG/1; MG/1
1 TABLET ORAL DAILY
COMMUNITY

## 2022-03-30 NOTE — PROGRESS NOTES
Cm placed a call to [facility] agencies that are listed as a tier 2 under benfits, no tier 1 at this time. Facility reported not in network with [place]          PT services available ONLY NO OT or ST        PT/OT services available, but ST is TBD. Advised CM to call back tomorrow for an update on ST. Facility will not be able to see pt until next week         VM left        VM left    CM will reach out to [facility] again tomorrow for an update on ST availability and [facility] to verify if tier 1 is an option with benefits.

## 2022-03-30 NOTE — PROGRESS NOTES
Care Transitions Initial Call    Call within 2 business days of discharge: Yes     Patient: Laurie Nick Patient : 1960 MRN: [id number]    Last Discharge [address]       Complaint Diagnosis Description Type Department Provider    3/26/22 Facial Droop Cerebrovascular accident (CVA), unspecified mechanism [facility]) ED to Hosp-Admission (Discharged) (ADMIT) Rhonda Montero MD; Franky Harman. .. Pt was admitted to THE [facility] 3/26/22. Admission dates 3/26/22 - 3/29/22. Diagnosis: CVA - given TPA    Was this an external facility discharge? 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 Pt continues to have residual affects from stroke reporting weakness of left arm, left facial droop and slurred speech with drooling. Patient denies C/P, SOB, cough, wheezing, fever, pain/swelling of legs or feet, N/V, diarrhea, difficulty urinating or constipation. Appetite/hydration poor-fair. Pt reported food doesn't taste right and consuming fluids requires a process that is time consuming in order to avoid possible choking and or aspiration. Pt reported she was cleared to consume a regular diet with regular consistency of fluids without thickening, but was advised to continue working with ST, PT and OT post d/c. Pt was not d/c with [facility] services. CM will assist pt with arranging [facility] services. Pt is unable to drive and is currently home bound. Outpt services is not an option for the pt. Challenges to be reviewed by the provider   Additional needs identified to be addressed with provider: yes  home health care- pt was not set up with [facility] services at time of d/c. Pt is requiring PT/OT and ST. SN is TBD. Pt will require order along with PCP office note to be faxed to [address] [place] [address] [place] of choice.  This CM is currently reaching to agencies to determine which company will be able to provide requires services within network    Driving clearance will be required        Method of communication with provider : phone    Discussed [phone] related testing which was not done at this time. Test results were not done. Patient informed of results, if available? n/a     Advance Care Planning:   Does patient have an Advance Directive: yes, reviewed and current. Inpatient Readmission Risk score: Unplanned Readmit Risk Score: 7.1 ( )    Was this a readmission? no   Patient stated reason for the admission: n/a    Patients top risk factors for readmission: functional physical ability, medical condition-[facility] of [facility]   Interventions to address risk factors: Scheduled appointment with PCP-22, Scheduled appointment with Specialist-neuro - TBD, vascular - TBD and Communication with home health agencies or other community services the patient is currently [address] working on finding an in [employer] company near home    Care Transition Nurse (CTN) contacted the patient by telephone to perform post hospital discharge assessment. Verified name and  with patient as identifiers. Provided introduction to self, and explanation of the CTN role. CTN reviewed discharge instructions, medical action plan and red flags with patient who verbalized understanding. Were discharge instructions available to patient? yes. Reviewed appropriate site of care based on symptoms and resources available to patient including: PCP, Specialist, Benefits related nurse triage line, [facility] , [facility] and [facility] services TBD. Patient given an opportunity to ask questions and does not have any further questions or concerns at this time. The patient agrees to contact the PCP office for questions related to their healthcare. Medication reconciliation was performed with patient, who verbalizes understanding of administration of home medications. Advised obtaining a 90-day supply of all daily and as-needed medications.    Referral to Pharm D needed: no     Home Health/Outpatient orders at discharge: PT, OT, SLP and services not set up  [address] [place] [address] Way: TBD  Date of initial visit: TBD    Durable Medical Equipment ordered at discharge: None  [address] received: n/a    Covid Risk Education    Educated patient about risk for severe COVID-19 due to risk factors according to CDC guidelines. ACM reviewed discharge instructions, medical action plan and red flag symptoms with the patient who verbalized understanding. Discussed COVID vaccination status: no. Education provided on COVID-19 vaccination as appropriate. Discussed exposure protocols and quarantine with CDC Guidelines. Patient was given an opportunity to verbalize any questions and concerns and agrees to contact ACM or health care provider for questions related to their healthcare. Was patient discharged with a pulse oximeter? No, but was advised to monitor at time of d/c. Pt has ordered one from [employer]. Discussed and confirmed pulse oximeter discharge instructions and when to notify provider or seek emergency care. Discussed follow-up appointments. If no appointment was previously scheduled, appointment scheduling offered: yes. Is follow up appointment scheduled within 7 days of discharge? yes. [address] follow up appointment(s): No future appointments. Non-[facility] follow up appointment(s): PCP 4/4/22, neuro - TBD in 2 mo's, vascular - TBD    Plan for follow-up call in 1-2 days based on severity of symptoms and risk factors. Plan for next call: symptom management-related to CVA, follow up appointment-PCP, neuro and vascular and referrals- agency TBD  CTN provided contact information for future needs. Goals Addressed                    This Visit's Progress      Attends follow-up appointments as directed.          PCP - 4/4/22  Neuro - TBD in 2 mo's per pt  Vascular - TBD  RTW - TBD        Care coordination related to CVA (pt-stated)         Weakness to left arm, left facial drop and slurring        Knowledge and adherence of prescribed medication (ie. action, side effects, missed dose, etc.). Taking prescribed meds  [facility] - TBD          Supportive resources in place to maintain patient in the community (ie. Home Health, DME equipment, refer to, medication assistant plan, etc.)         [facility] - # [phone] 24/7  Nurse Access line 24/7  If you have COVID-19 symptoms, have tested positive for COVID-19 or have been exposed to someone with COVID-19, call the Nurse Access Line at [phone] and select option 8. Be Well  [address] [employer] [facility] 97 Urgent [facility] [phone]  HR Service Now - [facility] Workday  IT - [phone]  [employer]andolph Help - 1- [phone]  Leave of absence from work (other than jury duty and bereavement), call [phone] option 1 or call the dedicated line at [facility] ([phone]). [employer] agents are available weekdays 8:30 a.m. - 10:30 p.m. ET. You also may: [place] for website access at [facility]. Download the [employer] mobile yaneli on or after Jan. 1 for on-the-go access to your team reports. Submit information by fax to [phone]. Life Matters - [phone] Go to [employer] on the Internet or your mobile device and enter the password [email or web address] to access resources, educational information, and self-service options.     HH PT/OT and SN services - TBD

## 2022-03-31 ENCOUNTER — PATIENT OUTREACH (OUTPATIENT)
Dept: OTHER | Age: 62
End: 2022-03-31

## 2022-03-31 NOTE — PROGRESS NOTES
VM received from [facility] agency, returned call. Per intake, they will be able to provide PT/OT and ST services, but will need an order from PCP, office note and or hosp d/c summary, demographic sheet and insurance info faxed to F# [phone]. Call placed to PCP office. [facility] will want to see pt first. CM explained that pt's appt isn't until 4/4/22. Explained that pt's d/c note stated pt will be sent home with [facility] services, but unfortunately this was not set up before d/c. PSR was able to schedule the pt for tomorrow 4/1/22 at 11:00. Call placed to pt, no answer. VM left to return call. VM received from PCP office stating pt called and said that she will not be able to attend appt tomorrow, but will keep appt on Mon. Call placed to PCP office requesting PCP office fax requested info if possible before appt to avoid a delay in services. Referred PCP to hospital d/c summary. PSR stated that she will speak to provider. CM will attempt to reach pt at a later time.

## 2022-03-31 NOTE — PROGRESS NOTES
ERICK Outreach    Call received from pt, Verified  and address for HIPAA security. Updated pt that [facility] [place] will be able to provide services, but will require an order from PCP. Pt explained that she will not be able to see PCP earlier and is planning to keep f/u appt previously scheduled for 22. CM updated PCP office regarding required documents and fax # to submit information. CM will f/u with pt after PCP appt.

## 2022-04-05 ENCOUNTER — PATIENT OUTREACH (OUTPATIENT)
Dept: OTHER | Age: 62
End: 2022-04-05

## 2022-04-05 NOTE — PROGRESS NOTES
ERICK Outreach    Call placed to patient, no answer. Voicemail left to return call. Purpose of TC    Pt was admitted to THE [facility] 3/26/22. Admission dates 3/26/22 - 3/29/22. Diagnosis: CVA - given TPA    F/U on PCP ERICK appt 4/4/22    F/U on [facility] orders for PT/OT and ST    F/U on neuro and vascular appt's     TC details    Call placed to patient, no answer. Voicemail left to return call. Plan of action  Provide support to patient. F/U in 2 days if no return call    Goals       Attends follow-up appointments as directed. PCP - 4/4/22  Neuro - TBD in 2 mo's per pt  Vascular - TBD  Surveillance carotid duplex study outpatient - TBD  RTW - TBD    4/5/22  Call placed to patient, no answer. Voicemail left to return call.   Care coordination related to CVA (pt-stated)       Weakness to left arm, left facial drop and slurring        Knowledge and adherence of prescribed medication (ie. action, side effects, missed dose, etc.). Taking prescribed meds  [facility] - TBD          Supportive resources in place to maintain patient in the community (ie. Home Health, DME equipment, refer to, medication assistant plan, etc.)       [facility] - # [phone] 24/7  Nurse Access line 24/7  If you have COVID-19 symptoms, have tested positive for COVID-19 or have been exposed to someone with COVID-19, call the Nurse Access Line at [phone] and select option 8. Be Well  [address] [phone]  HR Service Now - [facility] Workday  IT - [phone]  MyChart Help - 1- [phone]  Leave of absence from work (other than jury duty and bereavement), call [phone] option 1 or call the dedicated line at [address] [place] [address] ([phone]). [employer] agents are available weekdays 8:30 a.m. - 10:30 p.m. ET. You also may: [place] for website access at [facility].  Download the [employer] mobile yaneli on or after Jan. 1 for on-the-go access to your team reports. Submit information by fax to [phone]. Life Matters - [phone] Go to [facility]` on the Internet or your mobile device and enter the password [email or web address] to access resources, educational information, and self-service options.     HH PT/OT and SN services - TBD

## 2022-04-07 ENCOUNTER — PATIENT OUTREACH (OUTPATIENT)
Dept: OTHER | Age: 62
End: 2022-04-07

## 2022-04-07 NOTE — PROGRESS NOTES
Care Transitions Follow Up Call    Challenges to be reviewed by the provider   Additional needs identified to be addressed with provider: yes  home health care- order needs to be faxed            Method of communication with provider : phone    Care Transition Nurse (CTN) contacted the patient by telephone to follow up after admission on 3/26/22. Verified name and  with patient as identifiers. Addressed changes since last contact: home health care- pending waiting on PCP to fax orders. PCP was unable to send orders prior to [facility] appt  Follow up appointment completed? yes. Was follow up appointment scheduled within 7 days of discharge? yes. Advance Care Planning:   Does patient have an Advance Directive: yes, reviewed and current. CTN reviewed discharge instructions, medical action plan and red flags with patient and discussed any barriers to care and/or understanding of plan of care after discharge. Discussed appropriate site of care based on symptoms and resources available to patient including: PCP, Specialist, Benefits related nurse triage line, Urgent [address] [facility], [address], When to call 911 and [employer]. The patient agrees to contact the PCP office for questions related to their healthcare. Patients top risk factors for readmission: medical condition-CVA   Interventions to address risk factors: Scheduled appointment with PCP-attended appt 22, originally scheduled for 22, but PCP rescheduled, Scheduled appointment with Specialist-neuro - TBD, vascular - TBD , Obtained and reviewed discharge summary and/or continuity of care documents and Communication with home health agencies or other community services the patient is currently using-[facility] - TBD after receiving orders     [facility] follow up appointment(s): No future appointments.   Non-[facility] follow up appointment(s): PCP - TBD, neuro - TBD, vascular - TBD     CTN provided contact information for future needs. 4 days based on severity of symptoms and risk factors. Plan for next call: symptom management-related to CVA, follow up appointment-PCP, neuro and vascular and referrals-     Goals       Attends follow-up appointments as directed. PCP - 4/4/22  Neuro - TBD in 2 mo's per pt  Vascular - TBD  Surveillance carotid duplex study outpatient - TBD  RTW - TBD    4/5/22  Call placed to patient, no answer. Voicemail left to return call. 4/7/22  PCP - 4/4/22 appt r/s to 4/6/22, attended F/U TBD  Neuro - TBD in 2 mo's per pt  Vascular - TBD  Surveillance carotid duplex study outpatient - TBD  RTW - TBD            Care coordination related to CVA (pt-stated)       Weakness to left arm, left facial drop and slurring        Knowledge and adherence of prescribed medication (ie. action, side effects, missed dose, etc.). Taking prescribed meds  [facility] - TBD          Supportive resources in place to maintain patient in the community (ie. Home Health, DME equipment, refer to, medication assistant plan, etc.)       [facility] - # [phone] 24/7  Nurse Access line 24/7  If you have COVID-19 symptoms, have tested positive for COVID-19 or have been exposed to someone with COVID-19, call the Nurse Access Line at [phone] and select option 8. Be Well  [address] [phone]  HR Service Now - [facility] Workday  IT - [phone]  [employer] Help - 1- [phone]  Leave of absence from work (other than jury duty and bereavement), call [phone] option 1 or call the dedicated line at 524[facility] ([phone]). [employer] agents are available weekdays 8:30 a.m. - 10:30 p.m. ET. You also may: [place] for website access at [facility]. Download the [employer] mobile yaneli on or after Jan. 1 for on-the-go access to your team reports. Submit information by fax to [phone].   Life Matters - [phone] Go to [employer] on the Internet or your mobile device and enter the password [email or web address] to access resources, educational information, and self-service options.     HH PT/OT and SN services - [place]

## 2022-04-08 ENCOUNTER — PATIENT OUTREACH (OUTPATIENT)
Dept: OTHER | Age: 62
End: 2022-04-08

## 2022-04-08 NOTE — PROGRESS NOTES
Call placed to PCP to confirm [facility] was faed. Per PSR Corrine, order for PT/OT and ST was faxed this morning. Call placed to [facility], no answer. VM left for a return call.

## 2022-04-11 ENCOUNTER — PATIENT OUTREACH (OUTPATIENT)
Dept: OTHER | Age: 62
End: 2022-04-11

## 2022-04-11 NOTE — PROGRESS NOTES
Call placed to pt, Verified  and address for HIPAA security. CM called to verify if [place] had contacted her about services, which they have not. CM placed a call again to [facility] [place], no answer. VM left again for a return call. CM will f/u in 1 day if no return call.

## 2022-04-13 ENCOUNTER — PATIENT OUTREACH (OUTPATIENT)
Dept: OTHER | Age: 62
End: 2022-04-13

## 2022-04-13 NOTE — PROGRESS NOTES
Call placed to patient, no answer. Voicemail left to return call. Call placed to [facility]. Spoke to  who informed this CM that are currently not following pt. Explained to rep that PCP office informed this CM last week orders had been faxed over. Rep requested to call this CM back after looking into situation. CM has left several message with no return call prior to today.

## 2022-04-15 ENCOUNTER — PATIENT OUTREACH (OUTPATIENT)
Dept: OTHER | Age: 62
End: 2022-04-15

## 2022-04-15 NOTE — PROGRESS NOTES
Call placed to [facility] [place] to confirm services has started, no answer. VM left     Call placed to patient, no answer. Voicemail left to return call. CM will f/u in 3 days if no return call.

## 2022-04-18 ENCOUNTER — PATIENT OUTREACH (OUTPATIENT)
Dept: OTHER | Age: 62
End: 2022-04-18

## 2022-04-18 NOTE — PROGRESS NOTES
Late Entry from 4/15/22    Call received from pt, Verified  and address for HIPAA security. Pt calling to report HH has been in contact with her and that PT was out today.  OT and ST will be out next week (week of 22.)    CM will f/u in 1 week

## 2022-05-06 ENCOUNTER — PATIENT OUTREACH (OUTPATIENT)
Dept: OTHER | Age: 62
End: 2022-05-06

## 2022-05-06 NOTE — PROGRESS NOTES
Resolving current episode ERICK (Transitions of care complete). No further ED/UC or hospital admissions within 30 days post discharge. Patient attended follow-up appointments as directed. No outreach from patient to [address] [place] [address].

## 2024-05-24 NOTE — PROGRESS NOTES
Physical Therapy Visit    Visit Type: Daily Treatment Note  Visit: 3  Referring Provider: Fidelina Velazquez DO  Medical Diagnosis (from order): M54.2 - Neck pain on left side     SUBJECTIVE                                                                                                               Patient states her neck feels about the same today. Patient continues to report pain throughout the left side of the neck, particularly with turn her head.     Pain / Symptoms  - Pain rating (out of 10): Current: 1       OBJECTIVE                                                                                                                    Posture:  - Shoulders: rounded  Spine: increased thoracic kyphosis  Cervical: forward head          Palpation  Tightness and tenderness with palpation throughout the left cervical paraspinals, upper traps, and levator scapula  Joint Play   Limited mobility with segmental testing throughout the cervical spine bilaterally, left greater than right                Treatment     Therapeutic Exercise  Supine chin tucks 1 x 10 with 5 second hold  Band resisted horizontal abduction with scapular retraction with yellow theraband 1 x 10   Band resisted high row with green theraband 1 x 10   Band resisted lat pull down with red theraband 1 x 10   Standing physioball wall slides with thoracic extension 1 x 10   Standing shoulder W slides on wall 1 x 10   Thorough review of HEP, discuss plan of care     Manual Therapy   STM with TPR left upper traps and cervical paraspinals  Manual cervical traction  Lower cervical down glides right/left   Lower cervical up glides right/left  Inferior first rib mobilization left - grade III     Skilled input: verbal instruction/cues and tactile instruction/cues    Writer verbally educated and received verbal consent for hand placement, positioning of patient, and techniques to be performed today from patient for clothing adjustments for techniques, therapist  Return call received from intake with HH. Informed this CM that they have spoken to the pt who has agreed to services. HH reported they did not receive the order from PCP, but once they receive the order PT will be able to see the pt this Fri 4/15/22. Call placed to PCP office. Spoke with PSR who reported she will fax order now. Fax number and contact # provided again. position for techniques and hand placement and palpation for techniques as described above and how they are pertinent to the patient's plan of care.  Home Exercise Program  Access Code: [email or web address]  - Seated Cervical Extension AROM  - 2-3 x daily - 7 x weekly - 10-15 reps  - Seated Cervical Rotation AROM  - 2-3 x daily - 7 x weekly - 10-15 reps  - Seated Upper Trapezius Stretch  - 2-3 x daily - 7 x weekly - 10-15 reps  - Seated Levator Scapulae Stretch  - 2-3 x daily - 7 x weekly - 10-15 reps  - Seated Scapular Retraction  - 2-3 x daily - 7 x weekly - 10-15 reps  - Supine Chin Tuck  - 2-3 x daily - 7 x weekly - 10-15 reps  - Supine Suboccipital Release with Tennis Balls  - 2-3 x daily - 7 x weekly - 10-15 reps  - Standing 'L' Stretch at Counter  - 2-3 x daily - 7 x weekly - 10-15 reps  - Seated Child's Pose with Table  - 2-3 x daily - 7 x weekly - 10-15 reps      ASSESSMENT                                                                                                            Patient presents with reports of continued left sided neck pain that impairs her ability to to look over her shoulder. Patient demonstrates tightness and tenderness with palpation throughout the left cervical paraspinals and upper traps as well as limited cervical mobility. Patient was educated in the performance of additional stretches to add to her current HEP.        Therapy procedure time and total treatment time can be found documented on the Time Entry flowsheet